# Patient Record
Sex: MALE | Race: WHITE | Employment: FULL TIME | URBAN - METROPOLITAN AREA
[De-identification: names, ages, dates, MRNs, and addresses within clinical notes are randomized per-mention and may not be internally consistent; named-entity substitution may affect disease eponyms.]

---

## 2019-09-30 ENCOUNTER — OFFICE VISIT (OUTPATIENT)
Dept: ORTHOPEDIC SURGERY | Age: 49
End: 2019-09-30
Payer: COMMERCIAL

## 2019-09-30 VITALS
HEART RATE: 87 BPM | BODY MASS INDEX: 33.11 KG/M2 | DIASTOLIC BLOOD PRESSURE: 84 MMHG | HEIGHT: 74 IN | SYSTOLIC BLOOD PRESSURE: 138 MMHG | WEIGHT: 258 LBS

## 2019-09-30 DIAGNOSIS — M79.604 RIGHT LEG PAIN: ICD-10-CM

## 2019-09-30 DIAGNOSIS — S82.451A CLOSED DISPLACED COMMINUTED FRACTURE OF SHAFT OF RIGHT FIBULA, INITIAL ENCOUNTER: ICD-10-CM

## 2019-09-30 DIAGNOSIS — S93.04XA ANKLE DISLOCATION, RIGHT, INITIAL ENCOUNTER: ICD-10-CM

## 2019-09-30 DIAGNOSIS — M25.571 ACUTE RIGHT ANKLE PAIN: Primary | ICD-10-CM

## 2019-09-30 DIAGNOSIS — S82.864A CLOSED NONDISPLACED MAISONNEUVE FRACTURE OF RIGHT LOWER EXTREMITY, INITIAL ENCOUNTER: ICD-10-CM

## 2019-09-30 PROCEDURE — 29515 APPLICATION SHORT LEG SPLINT: CPT | Performed by: FAMILY MEDICINE

## 2019-09-30 PROCEDURE — 99203 OFFICE O/P NEW LOW 30 MIN: CPT | Performed by: FAMILY MEDICINE

## 2019-09-30 RX ORDER — HYDROCODONE BITARTRATE AND ACETAMINOPHEN 5; 325 MG/1; MG/1
1 TABLET ORAL EVERY 6 HOURS PRN
Qty: 28 TABLET | Refills: 0 | Status: ON HOLD | OUTPATIENT
Start: 2019-09-30 | End: 2019-10-07 | Stop reason: HOSPADM

## 2019-09-30 RX ORDER — IBUPROFEN 800 MG/1
800 TABLET ORAL EVERY 8 HOURS PRN
Qty: 90 TABLET | Refills: 3 | Status: SHIPPED | OUTPATIENT
Start: 2019-09-30 | End: 2019-09-30 | Stop reason: CLARIF

## 2019-09-30 RX ORDER — IBUPROFEN 200 MG
200 TABLET ORAL EVERY 6 HOURS PRN
Status: ON HOLD | COMMUNITY
End: 2019-10-07

## 2019-09-30 RX ORDER — HYDROCODONE BITARTRATE AND ACETAMINOPHEN 5; 325 MG/1; MG/1
1 TABLET ORAL
COMMUNITY
Start: 2019-09-28 | End: 2019-10-01

## 2019-09-30 RX ORDER — IBUPROFEN 800 MG/1
800 TABLET ORAL EVERY 8 HOURS PRN
Qty: 90 TABLET | Refills: 3 | Status: SHIPPED | OUTPATIENT
Start: 2019-09-30 | End: 2019-10-30

## 2019-09-30 NOTE — PROGRESS NOTES
right ankle pain Yes    Right leg pain     Closed displaced comminuted fracture of shaft of right fibula, initial encounter     Closed nondisplaced Maisonneuve fracture of right lower extremity, initial encounter     Ankle dislocation, right, initial encounter        Orders Placed This Encounter   Procedures    XR ANKLE RIGHT (MIN 3 VIEWS)     Standing Status:   Future     Number of Occurrences:   1     Standing Expiration Date:   9/30/2020     Order Specific Question:   Reason for exam:     Answer:   pain    XR TIBIA FIBULA RIGHT (2 VIEWS)     Standing Status:   Future     Number of Occurrences:   1     Standing Expiration Date:   9/30/2020   Sherlye Wagner MD, Orthopedic Surgery, [facility]     Referral Priority:   Routine     Referral Type:   Eval and Treat     Referral Reason:   Specialty Services Required     Referred to Provider:   Ashley Aguilera MD     Requested Specialty:   Orthopedic Surgery     Number of Visits Requested:   1    ID CAST SUP SHORT LEG SPLINT ADULT FIBERGLASS    ID APPLY LOWER LEG SPLINT           Treatment Plan:  Treatment options were discussed Alma Johnson. We did review his plain films and exam findings. He does appear to have a Maisonneuve injury to his ankle complicated by ankle dislocation on 9/28/2019 and does have medial mortise widening as well as a mildly displaced fibular shaft fracture with a possible posterior malleolar component. He is swollen he was converted from his posterior splint to a well molded Burgess dressing with lateral support. He will work on ice and elevation. He was given a prescription for ibuprofen 800 mg 1 pill 3 times daily given a refill on his hydrocodone 5/325 1 p.o. every 6 hours as needed pain. He will ice and elevate. He is off of work and I would like for him to have foot and ankle consultation with Dr. Ashley Aguilera to consider stabilization given his ankle mortise widening.   He did have an appointment later this week with

## 2019-10-03 ENCOUNTER — OFFICE VISIT (OUTPATIENT)
Dept: ORTHOPEDIC SURGERY | Age: 49
End: 2019-10-03
Payer: COMMERCIAL

## 2019-10-03 VITALS — BODY MASS INDEX: 33.1 KG/M2 | WEIGHT: 257.94 LBS | HEIGHT: 74 IN

## 2019-10-03 DIAGNOSIS — S82.864A CLOSED NONDISPLACED MAISONNEUVE FRACTURE OF RIGHT LOWER EXTREMITY, INITIAL ENCOUNTER: Primary | ICD-10-CM

## 2019-10-03 PROCEDURE — 99213 OFFICE O/P EST LOW 20 MIN: CPT | Performed by: ORTHOPAEDIC SURGERY

## 2019-10-04 ENCOUNTER — TELEPHONE (OUTPATIENT)
Dept: ORTHOPEDIC SURGERY | Age: 49
End: 2019-10-04

## 2019-10-04 ENCOUNTER — ANESTHESIA EVENT (OUTPATIENT)
Dept: OPERATING ROOM | Age: 49
End: 2019-10-04
Payer: COMMERCIAL

## 2019-10-07 ENCOUNTER — HOSPITAL ENCOUNTER (OUTPATIENT)
Age: 49
Setting detail: OUTPATIENT SURGERY
Discharge: HOME | End: 2019-10-07
Attending: ORTHOPAEDIC SURGERY | Admitting: ORTHOPAEDIC SURGERY
Payer: COMMERCIAL

## 2019-10-07 ENCOUNTER — ANESTHESIA (OUTPATIENT)
Dept: OPERATING ROOM | Age: 49
End: 2019-10-07
Payer: COMMERCIAL

## 2019-10-07 ENCOUNTER — TELEPHONE (OUTPATIENT)
Dept: ORTHOPEDIC SURGERY | Age: 49
End: 2019-10-07

## 2019-10-07 VITALS
WEIGHT: 258 LBS | DIASTOLIC BLOOD PRESSURE: 73 MMHG | BODY MASS INDEX: 33.11 KG/M2 | TEMPERATURE: 97.2 F | HEIGHT: 74 IN | HEART RATE: 67 BPM | RESPIRATION RATE: 16 BRPM | OXYGEN SATURATION: 97 % | SYSTOLIC BLOOD PRESSURE: 125 MMHG

## 2019-10-07 VITALS
DIASTOLIC BLOOD PRESSURE: 57 MMHG | SYSTOLIC BLOOD PRESSURE: 116 MMHG | RESPIRATION RATE: 7 BRPM | OXYGEN SATURATION: 97 %

## 2019-10-07 DIAGNOSIS — S82.861G: Primary | ICD-10-CM

## 2019-10-07 PROCEDURE — 64447 NJX AA&/STRD FEMORAL NRV IMG: CPT | Performed by: ANESTHESIOLOGY

## 2019-10-07 PROCEDURE — 3700000000 HC ANESTHESIA ATTENDED CARE: Performed by: ORTHOPAEDIC SURGERY

## 2019-10-07 PROCEDURE — 3600000013 HC SURGERY LEVEL 3 ADDTL 15MIN: Performed by: ORTHOPAEDIC SURGERY

## 2019-10-07 PROCEDURE — 7100000000 HC PACU RECOVERY - FIRST 15 MIN: Performed by: ORTHOPAEDIC SURGERY

## 2019-10-07 PROCEDURE — 3700000001 HC ADD 15 MINUTES (ANESTHESIA): Performed by: ORTHOPAEDIC SURGERY

## 2019-10-07 PROCEDURE — 64445 NJX AA&/STRD SCIATIC NRV IMG: CPT | Performed by: ANESTHESIOLOGY

## 2019-10-07 PROCEDURE — 2500000003 HC RX 250 WO HCPCS: Performed by: ORTHOPAEDIC SURGERY

## 2019-10-07 PROCEDURE — 7100000011 HC PHASE II RECOVERY - ADDTL 15 MIN: Performed by: ORTHOPAEDIC SURGERY

## 2019-10-07 PROCEDURE — 6360000002 HC RX W HCPCS: Performed by: ANESTHESIOLOGY

## 2019-10-07 PROCEDURE — 3600000003 HC SURGERY LEVEL 3 BASE: Performed by: ORTHOPAEDIC SURGERY

## 2019-10-07 PROCEDURE — 2500000003 HC RX 250 WO HCPCS: Performed by: NURSE ANESTHETIST, CERTIFIED REGISTERED

## 2019-10-07 PROCEDURE — 2500000003 HC RX 250 WO HCPCS: Performed by: ANESTHESIOLOGY

## 2019-10-07 PROCEDURE — C1713 ANCHOR/SCREW BN/BN,TIS/BN: HCPCS | Performed by: ORTHOPAEDIC SURGERY

## 2019-10-07 PROCEDURE — 6370000000 HC RX 637 (ALT 250 FOR IP): Performed by: ORTHOPAEDIC SURGERY

## 2019-10-07 PROCEDURE — 7100000001 HC PACU RECOVERY - ADDTL 15 MIN: Performed by: ORTHOPAEDIC SURGERY

## 2019-10-07 PROCEDURE — 2580000003 HC RX 258: Performed by: ORTHOPAEDIC SURGERY

## 2019-10-07 PROCEDURE — 7100000010 HC PHASE II RECOVERY - FIRST 15 MIN: Performed by: ORTHOPAEDIC SURGERY

## 2019-10-07 PROCEDURE — 6360000002 HC RX W HCPCS: Performed by: NURSE ANESTHETIST, CERTIFIED REGISTERED

## 2019-10-07 PROCEDURE — 6370000000 HC RX 637 (ALT 250 FOR IP): Performed by: ANESTHESIOLOGY

## 2019-10-07 PROCEDURE — 2709999900 HC NON-CHARGEABLE SUPPLY: Performed by: ORTHOPAEDIC SURGERY

## 2019-10-07 PROCEDURE — 2580000003 HC RX 258: Performed by: ANESTHESIOLOGY

## 2019-10-07 DEVICE — IMPLANTABLE DEVICE: Type: IMPLANTABLE DEVICE | Site: ANKLE | Status: FUNCTIONAL

## 2019-10-07 DEVICE — SYSTEM IMPL S STL KNOTLESS FOR SYNDESMOSIS REP TIGHTROPE: Type: IMPLANTABLE DEVICE | Site: ANKLE | Status: FUNCTIONAL

## 2019-10-07 RX ORDER — HYDROCODONE BITARTRATE AND ACETAMINOPHEN 5; 325 MG/1; MG/1
1 TABLET ORAL EVERY 6 HOURS PRN
Status: DISCONTINUED | OUTPATIENT
Start: 2019-10-07 | End: 2019-10-07 | Stop reason: HOSPADM

## 2019-10-07 RX ORDER — BUPIVACAINE HYDROCHLORIDE 2.5 MG/ML
INJECTION, SOLUTION EPIDURAL; INFILTRATION; INTRACAUDAL PRN
Status: DISCONTINUED | OUTPATIENT
Start: 2019-10-07 | End: 2019-10-07 | Stop reason: SDUPTHER

## 2019-10-07 RX ORDER — SODIUM CHLORIDE, SODIUM LACTATE, POTASSIUM CHLORIDE, CALCIUM CHLORIDE 600; 310; 30; 20 MG/100ML; MG/100ML; MG/100ML; MG/100ML
INJECTION, SOLUTION INTRAVENOUS CONTINUOUS
Status: DISCONTINUED | OUTPATIENT
Start: 2019-10-07 | End: 2019-10-07 | Stop reason: HOSPADM

## 2019-10-07 RX ORDER — PROMETHAZINE HYDROCHLORIDE 25 MG/ML
6.25 INJECTION, SOLUTION INTRAMUSCULAR; INTRAVENOUS
Status: DISCONTINUED | OUTPATIENT
Start: 2019-10-07 | End: 2019-10-07 | Stop reason: HOSPADM

## 2019-10-07 RX ORDER — MEPERIDINE HYDROCHLORIDE 50 MG/ML
12.5 INJECTION INTRAMUSCULAR; INTRAVENOUS; SUBCUTANEOUS EVERY 5 MIN PRN
Status: DISCONTINUED | OUTPATIENT
Start: 2019-10-07 | End: 2019-10-07 | Stop reason: HOSPADM

## 2019-10-07 RX ORDER — PROPOFOL 10 MG/ML
INJECTION, EMULSION INTRAVENOUS PRN
Status: DISCONTINUED | OUTPATIENT
Start: 2019-10-07 | End: 2019-10-07 | Stop reason: SDUPTHER

## 2019-10-07 RX ORDER — FENTANYL CITRATE 50 UG/ML
INJECTION, SOLUTION INTRAMUSCULAR; INTRAVENOUS
Status: COMPLETED
Start: 2019-10-07 | End: 2019-10-07

## 2019-10-07 RX ORDER — DEXAMETHASONE SODIUM PHOSPHATE 4 MG/ML
INJECTION, SOLUTION INTRA-ARTICULAR; INTRALESIONAL; INTRAMUSCULAR; INTRAVENOUS; SOFT TISSUE PRN
Status: DISCONTINUED | OUTPATIENT
Start: 2019-10-07 | End: 2019-10-07 | Stop reason: SDUPTHER

## 2019-10-07 RX ORDER — HYDROCODONE BITARTRATE AND ACETAMINOPHEN 5; 325 MG/1; MG/1
1 TABLET ORAL EVERY 6 HOURS PRN
Qty: 28 TABLET | Refills: 0 | Status: SHIPPED | OUTPATIENT
Start: 2019-10-07 | End: 2019-10-14 | Stop reason: SDUPTHER

## 2019-10-07 RX ORDER — SODIUM CHLORIDE 0.9 % (FLUSH) 0.9 %
10 SYRINGE (ML) INJECTION PRN
Status: DISCONTINUED | OUTPATIENT
Start: 2019-10-07 | End: 2019-10-07 | Stop reason: HOSPADM

## 2019-10-07 RX ORDER — LIDOCAINE HYDROCHLORIDE 20 MG/ML
INJECTION, SOLUTION EPIDURAL; INFILTRATION; INTRACAUDAL; PERINEURAL PRN
Status: DISCONTINUED | OUTPATIENT
Start: 2019-10-07 | End: 2019-10-07 | Stop reason: SDUPTHER

## 2019-10-07 RX ORDER — MIDAZOLAM HYDROCHLORIDE 1 MG/ML
INJECTION INTRAMUSCULAR; INTRAVENOUS PRN
Status: DISCONTINUED | OUTPATIENT
Start: 2019-10-07 | End: 2019-10-07 | Stop reason: SDUPTHER

## 2019-10-07 RX ORDER — FENTANYL CITRATE 50 UG/ML
25 INJECTION, SOLUTION INTRAMUSCULAR; INTRAVENOUS EVERY 5 MIN PRN
Status: DISCONTINUED | OUTPATIENT
Start: 2019-10-07 | End: 2019-10-07 | Stop reason: HOSPADM

## 2019-10-07 RX ORDER — ONDANSETRON 2 MG/ML
4 INJECTION INTRAMUSCULAR; INTRAVENOUS
Status: DISCONTINUED | OUTPATIENT
Start: 2019-10-07 | End: 2019-10-07 | Stop reason: HOSPADM

## 2019-10-07 RX ORDER — SODIUM CHLORIDE 0.9 % (FLUSH) 0.9 %
10 SYRINGE (ML) INJECTION EVERY 12 HOURS SCHEDULED
Status: DISCONTINUED | OUTPATIENT
Start: 2019-10-07 | End: 2019-10-07 | Stop reason: HOSPADM

## 2019-10-07 RX ORDER — OXYCODONE HYDROCHLORIDE AND ACETAMINOPHEN 5; 325 MG/1; MG/1
2 TABLET ORAL PRN
Status: DISCONTINUED | OUTPATIENT
Start: 2019-10-07 | End: 2019-10-07 | Stop reason: HOSPADM

## 2019-10-07 RX ORDER — MIDAZOLAM HYDROCHLORIDE 1 MG/ML
INJECTION INTRAMUSCULAR; INTRAVENOUS
Status: COMPLETED
Start: 2019-10-07 | End: 2019-10-07

## 2019-10-07 RX ORDER — ONDANSETRON 2 MG/ML
INJECTION INTRAMUSCULAR; INTRAVENOUS PRN
Status: DISCONTINUED | OUTPATIENT
Start: 2019-10-07 | End: 2019-10-07 | Stop reason: SDUPTHER

## 2019-10-07 RX ORDER — MAGNESIUM HYDROXIDE 1200 MG/15ML
LIQUID ORAL CONTINUOUS PRN
Status: COMPLETED | OUTPATIENT
Start: 2019-10-07 | End: 2019-10-07

## 2019-10-07 RX ORDER — HYDRALAZINE HYDROCHLORIDE 20 MG/ML
5 INJECTION INTRAMUSCULAR; INTRAVENOUS EVERY 10 MIN PRN
Status: DISCONTINUED | OUTPATIENT
Start: 2019-10-07 | End: 2019-10-07 | Stop reason: HOSPADM

## 2019-10-07 RX ORDER — FENTANYL CITRATE 50 UG/ML
INJECTION, SOLUTION INTRAMUSCULAR; INTRAVENOUS PRN
Status: DISCONTINUED | OUTPATIENT
Start: 2019-10-07 | End: 2019-10-07 | Stop reason: SDUPTHER

## 2019-10-07 RX ORDER — OXYCODONE HYDROCHLORIDE AND ACETAMINOPHEN 5; 325 MG/1; MG/1
1 TABLET ORAL PRN
Status: DISCONTINUED | OUTPATIENT
Start: 2019-10-07 | End: 2019-10-07 | Stop reason: HOSPADM

## 2019-10-07 RX ADMIN — HYDROMORPHONE HYDROCHLORIDE 0.25 MG: 1 INJECTION, SOLUTION INTRAMUSCULAR; INTRAVENOUS; SUBCUTANEOUS at 11:36

## 2019-10-07 RX ADMIN — Medication 900 MG: at 10:29

## 2019-10-07 RX ADMIN — ONDANSETRON 4 MG: 2 INJECTION INTRAMUSCULAR; INTRAVENOUS at 10:27

## 2019-10-07 RX ADMIN — FENTANYL CITRATE 25 MCG: 50 INJECTION INTRAMUSCULAR; INTRAVENOUS at 10:24

## 2019-10-07 RX ADMIN — SODIUM CHLORIDE, POTASSIUM CHLORIDE, SODIUM LACTATE AND CALCIUM CHLORIDE: 600; 310; 30; 20 INJECTION, SOLUTION INTRAVENOUS at 08:49

## 2019-10-07 RX ADMIN — BUPIVACAINE HYDROCHLORIDE 20 ML: 2.5 INJECTION, SOLUTION EPIDURAL; INFILTRATION; INTRACAUDAL; PERINEURAL at 09:02

## 2019-10-07 RX ADMIN — HYDROCODONE BITARTRATE AND ACETAMINOPHEN 1 TABLET: 5; 325 TABLET ORAL at 11:39

## 2019-10-07 RX ADMIN — PROPOFOL 200 MG: 10 INJECTION, EMULSION INTRAVENOUS at 10:24

## 2019-10-07 RX ADMIN — BUPIVACAINE HYDROCHLORIDE 30 ML: 2.5 INJECTION, SOLUTION EPIDURAL; INFILTRATION; INTRACAUDAL; PERINEURAL at 09:05

## 2019-10-07 RX ADMIN — FENTANYL CITRATE 100 MCG: 50 INJECTION INTRAMUSCULAR; INTRAVENOUS at 09:00

## 2019-10-07 RX ADMIN — LIDOCAINE HYDROCHLORIDE 40 MG: 20 INJECTION, SOLUTION EPIDURAL; INFILTRATION; INTRACAUDAL; PERINEURAL at 10:24

## 2019-10-07 RX ADMIN — DEXAMETHASONE SODIUM PHOSPHATE 10 MG: 4 INJECTION, SOLUTION INTRAMUSCULAR; INTRAVENOUS at 10:27

## 2019-10-07 RX ADMIN — FAMOTIDINE 20 MG: 10 INJECTION, SOLUTION INTRAVENOUS at 08:51

## 2019-10-07 RX ADMIN — MIDAZOLAM HYDROCHLORIDE 2 MG: 2 INJECTION, SOLUTION INTRAMUSCULAR; INTRAVENOUS at 09:00

## 2019-10-07 ASSESSMENT — PULMONARY FUNCTION TESTS
PIF_VALUE: 5
PIF_VALUE: 1
PIF_VALUE: 5
PIF_VALUE: 6
PIF_VALUE: 5
PIF_VALUE: 0
PIF_VALUE: 1
PIF_VALUE: 1
PIF_VALUE: 19
PIF_VALUE: 22
PIF_VALUE: 5
PIF_VALUE: 5
PIF_VALUE: 4
PIF_VALUE: 0
PIF_VALUE: 18
PIF_VALUE: 1
PIF_VALUE: 2
PIF_VALUE: 1
PIF_VALUE: 6
PIF_VALUE: 20
PIF_VALUE: 1
PIF_VALUE: 22
PIF_VALUE: 4
PIF_VALUE: 19
PIF_VALUE: 22
PIF_VALUE: 2
PIF_VALUE: 5
PIF_VALUE: 4
PIF_VALUE: 1
PIF_VALUE: 6
PIF_VALUE: 1
PIF_VALUE: 20
PIF_VALUE: 19
PIF_VALUE: 9
PIF_VALUE: 19
PIF_VALUE: 6
PIF_VALUE: 6
PIF_VALUE: 2
PIF_VALUE: 15
PIF_VALUE: 19
PIF_VALUE: 21
PIF_VALUE: 19
PIF_VALUE: 5
PIF_VALUE: 4
PIF_VALUE: 0
PIF_VALUE: 22
PIF_VALUE: 19
PIF_VALUE: 1
PIF_VALUE: 2
PIF_VALUE: 0

## 2019-10-07 ASSESSMENT — PAIN DESCRIPTION - PAIN TYPE
TYPE: SURGICAL PAIN

## 2019-10-07 ASSESSMENT — PAIN SCALES - GENERAL
PAINLEVEL_OUTOF10: 4
PAINLEVEL_OUTOF10: 4
PAINLEVEL_OUTOF10: 0

## 2019-10-07 ASSESSMENT — PAIN DESCRIPTION - DESCRIPTORS
DESCRIPTORS: ACHING
DESCRIPTORS: PRESSURE
DESCRIPTORS: ACHING

## 2019-10-07 ASSESSMENT — PAIN DESCRIPTION - LOCATION
LOCATION: ANKLE

## 2019-10-07 ASSESSMENT — PAIN DESCRIPTION - ORIENTATION
ORIENTATION: RIGHT

## 2019-10-07 ASSESSMENT — LIFESTYLE VARIABLES: SMOKING_STATUS: 0

## 2019-10-07 ASSESSMENT — PAIN DESCRIPTION - PROGRESSION: CLINICAL_PROGRESSION: RAPIDLY IMPROVING

## 2019-10-07 ASSESSMENT — PAIN - FUNCTIONAL ASSESSMENT: PAIN_FUNCTIONAL_ASSESSMENT: 0-10

## 2019-10-14 ENCOUNTER — TELEPHONE (OUTPATIENT)
Dept: ORTHOPEDIC SURGERY | Age: 49
End: 2019-10-14

## 2019-10-14 DIAGNOSIS — S82.861G: ICD-10-CM

## 2019-10-14 RX ORDER — HYDROCODONE BITARTRATE AND ACETAMINOPHEN 5; 325 MG/1; MG/1
1 TABLET ORAL EVERY 8 HOURS PRN
Qty: 21 TABLET | Refills: 0 | Status: SHIPPED | OUTPATIENT
Start: 2019-10-14 | End: 2019-10-23 | Stop reason: SDUPTHER

## 2019-10-17 ENCOUNTER — TELEPHONE (OUTPATIENT)
Dept: ORTHOPEDIC SURGERY | Age: 49
End: 2019-10-17

## 2019-10-22 ENCOUNTER — OFFICE VISIT (OUTPATIENT)
Dept: ORTHOPEDIC SURGERY | Age: 49
End: 2019-10-22
Payer: COMMERCIAL

## 2019-10-22 VITALS — BODY MASS INDEX: 33.1 KG/M2 | WEIGHT: 257.94 LBS | HEIGHT: 74 IN

## 2019-10-22 DIAGNOSIS — S82.864A CLOSED NONDISPLACED MAISONNEUVE FRACTURE OF RIGHT LOWER EXTREMITY, INITIAL ENCOUNTER: Primary | ICD-10-CM

## 2019-10-22 PROCEDURE — L4361 PNEUMA/VAC WALK BOOT PRE OTS: HCPCS | Performed by: ORTHOPAEDIC SURGERY

## 2019-10-22 PROCEDURE — 99024 POSTOP FOLLOW-UP VISIT: CPT | Performed by: ORTHOPAEDIC SURGERY

## 2019-10-23 ENCOUNTER — TELEPHONE (OUTPATIENT)
Dept: ORTHOPEDIC SURGERY | Age: 49
End: 2019-10-23

## 2019-10-23 DIAGNOSIS — S82.861G: ICD-10-CM

## 2019-10-23 RX ORDER — HYDROCODONE BITARTRATE AND ACETAMINOPHEN 5; 325 MG/1; MG/1
1 TABLET ORAL EVERY 8 HOURS PRN
Qty: 21 TABLET | Refills: 0 | Status: SHIPPED | OUTPATIENT
Start: 2019-10-23 | End: 2019-10-30

## 2019-11-12 ENCOUNTER — OFFICE VISIT (OUTPATIENT)
Dept: ORTHOPEDIC SURGERY | Age: 49
End: 2019-11-12

## 2019-11-12 VITALS — BODY MASS INDEX: 33.1 KG/M2 | HEIGHT: 74 IN | WEIGHT: 257.94 LBS

## 2019-11-12 DIAGNOSIS — S82.861G: Primary | ICD-10-CM

## 2019-11-12 PROCEDURE — 99024 POSTOP FOLLOW-UP VISIT: CPT | Performed by: ORTHOPAEDIC SURGERY

## 2019-11-20 ENCOUNTER — HOSPITAL ENCOUNTER (OUTPATIENT)
Dept: PHYSICAL THERAPY | Age: 49
Setting detail: THERAPIES SERIES
Discharge: HOME | End: 2019-11-20
Payer: COMMERCIAL

## 2019-11-20 PROCEDURE — 97110 THERAPEUTIC EXERCISES: CPT | Performed by: PHYSICAL THERAPIST

## 2019-11-20 PROCEDURE — 97161 PT EVAL LOW COMPLEX 20 MIN: CPT | Performed by: PHYSICAL THERAPIST

## 2019-11-27 ENCOUNTER — HOSPITAL ENCOUNTER (OUTPATIENT)
Dept: PHYSICAL THERAPY | Age: 49
Setting detail: THERAPIES SERIES
Discharge: HOME | End: 2019-11-27
Payer: COMMERCIAL

## 2019-11-27 PROCEDURE — 97016 VASOPNEUMATIC DEVICE THERAPY: CPT | Performed by: PHYSICAL THERAPIST

## 2019-11-27 PROCEDURE — 97110 THERAPEUTIC EXERCISES: CPT | Performed by: PHYSICAL THERAPIST

## 2019-11-27 PROCEDURE — 97140 MANUAL THERAPY 1/> REGIONS: CPT | Performed by: PHYSICAL THERAPIST

## 2019-11-29 ENCOUNTER — HOSPITAL ENCOUNTER (OUTPATIENT)
Dept: PHYSICAL THERAPY | Age: 49
Setting detail: THERAPIES SERIES
Discharge: HOME | End: 2019-11-29
Payer: COMMERCIAL

## 2019-11-29 PROCEDURE — 97110 THERAPEUTIC EXERCISES: CPT | Performed by: PHYSICAL THERAPIST

## 2019-11-29 PROCEDURE — 97140 MANUAL THERAPY 1/> REGIONS: CPT | Performed by: PHYSICAL THERAPIST

## 2019-11-29 PROCEDURE — 97016 VASOPNEUMATIC DEVICE THERAPY: CPT | Performed by: PHYSICAL THERAPIST

## 2019-12-02 ENCOUNTER — HOSPITAL ENCOUNTER (OUTPATIENT)
Dept: PHYSICAL THERAPY | Age: 49
Setting detail: THERAPIES SERIES
Discharge: HOME | End: 2019-12-02
Payer: COMMERCIAL

## 2019-12-02 PROCEDURE — 97140 MANUAL THERAPY 1/> REGIONS: CPT | Performed by: PHYSICAL THERAPIST

## 2019-12-02 PROCEDURE — 97016 VASOPNEUMATIC DEVICE THERAPY: CPT | Performed by: PHYSICAL THERAPIST

## 2019-12-02 PROCEDURE — 97110 THERAPEUTIC EXERCISES: CPT | Performed by: PHYSICAL THERAPIST

## 2019-12-03 ENCOUNTER — OFFICE VISIT (OUTPATIENT)
Dept: ORTHOPEDIC SURGERY | Age: 49
End: 2019-12-03
Payer: COMMERCIAL

## 2019-12-03 VITALS — BODY MASS INDEX: 33.1 KG/M2 | WEIGHT: 257.94 LBS | HEIGHT: 74 IN

## 2019-12-03 DIAGNOSIS — S82.861G: Primary | ICD-10-CM

## 2019-12-03 PROCEDURE — 99024 POSTOP FOLLOW-UP VISIT: CPT | Performed by: ORTHOPAEDIC SURGERY

## 2019-12-03 PROCEDURE — L1902 AFO ANKLE GAUNTLET PRE OTS: HCPCS | Performed by: ORTHOPAEDIC SURGERY

## 2019-12-04 ENCOUNTER — HOSPITAL ENCOUNTER (OUTPATIENT)
Dept: PHYSICAL THERAPY | Age: 49
Setting detail: THERAPIES SERIES
Discharge: HOME | End: 2019-12-04
Payer: COMMERCIAL

## 2019-12-04 PROCEDURE — 97016 VASOPNEUMATIC DEVICE THERAPY: CPT | Performed by: PHYSICAL THERAPIST

## 2019-12-04 PROCEDURE — 97110 THERAPEUTIC EXERCISES: CPT | Performed by: PHYSICAL THERAPIST

## 2019-12-04 PROCEDURE — 97140 MANUAL THERAPY 1/> REGIONS: CPT | Performed by: PHYSICAL THERAPIST

## 2019-12-09 ENCOUNTER — HOSPITAL ENCOUNTER (OUTPATIENT)
Dept: PHYSICAL THERAPY | Age: 49
Setting detail: THERAPIES SERIES
Discharge: HOME | End: 2019-12-09
Payer: COMMERCIAL

## 2019-12-09 PROCEDURE — 97140 MANUAL THERAPY 1/> REGIONS: CPT | Performed by: PHYSICAL THERAPIST

## 2019-12-09 PROCEDURE — 97110 THERAPEUTIC EXERCISES: CPT | Performed by: PHYSICAL THERAPIST

## 2019-12-11 ENCOUNTER — HOSPITAL ENCOUNTER (OUTPATIENT)
Dept: PHYSICAL THERAPY | Age: 49
Setting detail: THERAPIES SERIES
Discharge: HOME | End: 2019-12-11
Payer: COMMERCIAL

## 2019-12-11 PROCEDURE — 97110 THERAPEUTIC EXERCISES: CPT | Performed by: PHYSICAL THERAPIST

## 2019-12-11 PROCEDURE — 97140 MANUAL THERAPY 1/> REGIONS: CPT | Performed by: PHYSICAL THERAPIST

## 2019-12-16 ENCOUNTER — HOSPITAL ENCOUNTER (OUTPATIENT)
Dept: PHYSICAL THERAPY | Age: 49
Setting detail: THERAPIES SERIES
Discharge: HOME | End: 2019-12-16
Payer: COMMERCIAL

## 2019-12-16 PROCEDURE — 97110 THERAPEUTIC EXERCISES: CPT | Performed by: PHYSICAL THERAPIST

## 2019-12-16 PROCEDURE — 97016 VASOPNEUMATIC DEVICE THERAPY: CPT | Performed by: PHYSICAL THERAPIST

## 2019-12-16 PROCEDURE — 97140 MANUAL THERAPY 1/> REGIONS: CPT | Performed by: PHYSICAL THERAPIST

## 2019-12-18 ENCOUNTER — HOSPITAL ENCOUNTER (OUTPATIENT)
Dept: PHYSICAL THERAPY | Age: 49
Setting detail: THERAPIES SERIES
Discharge: HOME | End: 2019-12-18
Payer: COMMERCIAL

## 2019-12-18 PROCEDURE — 97110 THERAPEUTIC EXERCISES: CPT | Performed by: PHYSICAL THERAPIST

## 2019-12-18 PROCEDURE — 97140 MANUAL THERAPY 1/> REGIONS: CPT | Performed by: PHYSICAL THERAPIST

## 2019-12-23 ENCOUNTER — HOSPITAL ENCOUNTER (OUTPATIENT)
Dept: PHYSICAL THERAPY | Age: 49
Setting detail: THERAPIES SERIES
Discharge: HOME | End: 2019-12-23
Payer: COMMERCIAL

## 2019-12-23 PROCEDURE — 97110 THERAPEUTIC EXERCISES: CPT | Performed by: PHYSICAL THERAPIST

## 2019-12-23 PROCEDURE — 97140 MANUAL THERAPY 1/> REGIONS: CPT | Performed by: PHYSICAL THERAPIST

## 2019-12-27 ENCOUNTER — HOSPITAL ENCOUNTER (OUTPATIENT)
Dept: PHYSICAL THERAPY | Age: 49
Setting detail: THERAPIES SERIES
Discharge: HOME | End: 2019-12-27
Payer: COMMERCIAL

## 2019-12-27 PROCEDURE — 97110 THERAPEUTIC EXERCISES: CPT | Performed by: PHYSICAL THERAPIST

## 2019-12-27 PROCEDURE — 97140 MANUAL THERAPY 1/> REGIONS: CPT | Performed by: PHYSICAL THERAPIST

## 2019-12-30 ENCOUNTER — HOSPITAL ENCOUNTER (OUTPATIENT)
Dept: PHYSICAL THERAPY | Age: 49
Setting detail: THERAPIES SERIES
Discharge: HOME | End: 2019-12-30
Payer: COMMERCIAL

## 2019-12-30 PROCEDURE — 97140 MANUAL THERAPY 1/> REGIONS: CPT | Performed by: PHYSICAL THERAPIST

## 2019-12-30 PROCEDURE — 97016 VASOPNEUMATIC DEVICE THERAPY: CPT | Performed by: PHYSICAL THERAPIST

## 2019-12-30 PROCEDURE — 97110 THERAPEUTIC EXERCISES: CPT | Performed by: PHYSICAL THERAPIST

## 2020-01-03 ENCOUNTER — HOSPITAL ENCOUNTER (OUTPATIENT)
Dept: PHYSICAL THERAPY | Age: 50
Setting detail: THERAPIES SERIES
Discharge: HOME | End: 2020-01-03
Payer: COMMERCIAL

## 2020-01-03 PROCEDURE — 97110 THERAPEUTIC EXERCISES: CPT | Performed by: PHYSICAL THERAPIST

## 2020-01-03 PROCEDURE — 97140 MANUAL THERAPY 1/> REGIONS: CPT | Performed by: PHYSICAL THERAPIST

## 2020-01-03 NOTE — FLOWSHEET NOTE
[facility], [address]  Phone: [phone]  Fax [phone]    Physical Therapy Treatment Note/ Progress Report:           Date:  1/3/2020    Patient Name:  Claire Garrison    :  1970  MRN: [id number]  Restrictions/Precautions:    Medical/Treatment Diagnosis Information:  Diagnosis: S82.861G (ICD-10-CM) - Closed displaced Maisonneuve fracture of right lower leg. DOS:  10/7/19  OPERATION PERFORMED:  Open reduction syndesmotic repair Maisonneuve  Treatment Diagnosis: right ankle pain  M25.571, difficulty amb N10.2  Insurance/Certification information:  PT Insurance Information: BCBS - 3000D-MET-100%-$0CP-20PT- NEEDS [id number]  Physician Information:  Referring Practitioner: Dr. Susana Mendosa  Has the plan of care been signed (Y/N):        [x]  Yes  []  No     Date of Patient follow up with Physician:  20      Is this a Progress Report:     []  Yes  [x]  No        If Yes:  Date Range for reporting period:  Beginnin19  Ending:    Progress report will be due (10 Rx or 30 days whichever is less):        Recertification will be due (POC Duration  / 90 days whichever is less):        Visit # Insurance Allowable Requires auth   13 5 approved + 2 approved. + 2 approved + 9    []no        []yes:     Latex Allergy:  [x]NO      []YES  Preferred Language for Healthcare:   [x]English       []other:    SUBJECTIVE: Knee is feeling stronger and has less c/o pain. Pt feels he is unable to push off his hallux during gait as it causes medial ankle pain. Pt did go back to two crutches to calm down ankle.       OBJECTIVE:    Observation:       Test measurements:    ROM RIGHT current   Knee ext 5    Knee Flex 140    Ankle PF 30 40   Ankle DF Lacking 6 10   Ankle In 10 30   Ankle Ev 4 8   Strength  RIGHT    Quad tone fair    SLR indep    Ankle DF n/t    Ankle PF n/t    Ankle Inv n/t    Ankle EV n/t        Circumference    29.0 cm. Pain Scale 5-6 1-7   LEFS  80 24/80 = 70%     RESTRICTIONS/PRECAUTIONS: psoriasis    Exercises/Interventions:     See ATC. Therapeutic Ex (59302) Sets/sec Reps Notes/CUES      hep   hep            hep   hep   Seated HR / TL  2 min hep   1/2 foam DF/ PF  2 min     Towel curl  2 min    Roller s  2 min    Standing gastroc s :20 3x    Weight shift  Side to side/  Front to back  X 20    Mini squat  X 20    Manual Intervention (37362)      Manual gs X 2 min     STM to posterior tib and gastroc X 5 min   Hawks .     great toe stretching X 2 min      Prone quad stretch 4 x 20\"                  NMR re-education (14560)   CUES NEEDED   Tandem stance on foam 2x1 min  Unable with right foot back                       Therapeutic Exercise and NMR EXR  [x] (68086) Provided verbal/tactile cueing for activities related to strengthening, flexibility, endurance, ROM for improvements in LE, proximal hip, and core control with self care, mobility, lifting, ambulation.  [] (24632) Provided verbal/tactile cueing for activities related to improving balance, coordination, kinesthetic sense, posture, motor skill, proprioception  to assist with LE, proximal hip, and core control in self care, mobility, lifting, ambulation and eccentric single leg control.      NMR and Therapeutic Activities:    [] (17811 or 49366) Provided verbal/tactile cueing for activities related to improving balance, coordination, kinesthetic sense, posture, motor skill, proprioception and motor activation to allow for proper function of core, proximal hip and LE with self care and ADLs  [] (79335) Gait Re-education- Provided training and instruction to the patient for proper LE, core and proximal hip recruitment and positioning and eccentric body weight control with ambulation re-education including up and down stairs     Home Exercise Program:    [x] (05788) Reviewed/Progressed HEP activities related to strengthening, flexibility, endurance, ROM of core, proximal hip and LE for functional self-care, mobility, lifting and ambulation/stair navigation   [] (89401)Reviewed/Progressed HEP activities related to improving balance, coordination, kinesthetic sense, posture, motor skill, proprioception of core, proximal hip and LE for self care, mobility, lifting, and ambulation/stair navigation      Manual Treatments:  PROM / STM / Oscillations-Mobs:  G-I, II, III, IV (PA's, Inf., Post.)  [x] (06249) Provided manual therapy to mobilize LE, proximal hip and/or LS spine soft tissue/joints for the purpose of modulating pain, promoting relaxation,  increasing ROM, reducing/eliminating soft tissue swelling/inflammation/restriction, improving soft tissue extensibility and allowing for proper ROM for normal function with self care, mobility, lifting and ambulation. Modalities:     [] GAME READY (VASO)- for significant edema, swelling, pain control.  (low pressure)    Charges:  Timed Code Treatment Minutes: 35   Total Treatment Minutes: 55     [] EVAL (LOW) 68625 (typically 20 minutes face-to-face)  [] EVAL (MOD) 63821 (typically 30 minutes face-to-face)  [] EVAL (HIGH) 51454 (typically 45 minutes face-to-face)  [] RE-EVAL     [x] FR(91498) x 1   [] IONTO  [] NMR (42295) x     [] VASO  [x] Manual (33286) x 1     [x] Other:  Ice   [] TA x      [] Mech Traction (36028)  [] ES(attended) (12862)      [] ES (un) (46275):     GOALS:   Patient stated goal: standing/ walking/ running/ jumping. Work. [] Progressing: [] Met: [] Not Met: [] Adjusted    Therapist goals for Patient:   Short Term Goals: To be achieved in: 2 weeks  1. Independent in HEP and progression per patient tolerance, in order to prevent re-injury. [x] Progressing: [] Met: [] Not Met: [] Adjusted  2. Patient will have a decrease in pain to facilitate improvement in movement, function, and ADLs as indicated by Functional Deficits.   [x] Progressing: [] Met: [] Not Met: [] Adjusted    Long Term Goals: To be achieved in: 10 weeks  1. Disability index score of 30% or less for the LEFS to assist with reaching prior level of function. [x] Progressing: [] Met: [] Not Met: [] Adjusted  2. Patient will demonstrate increased AROM to 10 DF,  40 PF,  20 Inv, 10 EV to allow for proper joint functioning as indicated by patients Functional Deficits. [] Progressing: [x] Met: [] Not Met: [] Adjusted  3. Patient will demonstrate an increase in Strength to good proximal hip strength and control, within 5lb HHD in LE to allow for proper functional mobility as indicated by patients Functional Deficits. [] Progressing: [x] Met: [] Not Met: [] Adjusted  4. Patient will return to ascending and descending stairs and ladders without increased symptoms or restriction. [x] Progressing: [] Met: [] Not Met: [] Adjusted  5. Able to kneel, crawl, squat and return to work. [] Progressing: [] Met: [x] Not Met: [] Adjusted    Progression Towards Functional goals:  [x] Patient is progressing as expected towards functional goals listed. [] Progression is slowed due to complexities listed. [] Progression has been slowed due to co-morbidities. [] Plan just implemented, too soon to assess goals progression  [] Other:     Overall Progression Towards Functional goals/ Treatment Progress Update:  [x] Patient is progressing as expected towards functional goals listed. [] Progression is slowed due to complexities/Impairments listed. [] Progression has been slowed due to co-morbidities.   [] Plan just implemented, too soon to assess goals progression <30days   [] Goals require adjustment due to lack of progress  [] Patient is not progressing as expected and requires additional follow up with physician  [] Other    Prognosis for POC: [x] Good [] Fair  [] Poor      Patient requires continued skilled intervention: [x] Yes  [] No    Treatment/Activity Tolerance:  [x] Patient able to complete treatment  [] Patient

## 2020-01-03 NOTE — FLOWSHEET NOTE
Ecc.                           Inv.              OPAL   Flex                  ABd   45# R 3x10               ADd                 TKE   60# 30x5\"               Ext              Steps Up                  Up and Over                  Down                  Lateral                  Rotation              Squats  mini                     wall                    BOSU              Lunges:  Lunge to Balance                      Balance to Lunge                      Walking              Knee Extension Bilat. Ecc.                                  Inv. Hamstring Curls Bilat. Ecc.                                  Inv.              Soleus Press Bilat. Ecc.                              Inv.                                      Ladders                   Square                  Jump/Hop  Low                         Med.                         High                                                                     Modality declined declined declined GR 15'   Initials                             DTM DTM DTM DTM   Time spent one on one (workers comp)       Time spent with PT assistant

## 2020-01-06 ENCOUNTER — HOSPITAL ENCOUNTER (OUTPATIENT)
Dept: PHYSICAL THERAPY | Age: 50
Setting detail: THERAPIES SERIES
Discharge: HOME | End: 2020-01-06
Payer: COMMERCIAL

## 2020-01-06 PROCEDURE — 97140 MANUAL THERAPY 1/> REGIONS: CPT | Performed by: PHYSICAL THERAPIST

## 2020-01-06 PROCEDURE — 97110 THERAPEUTIC EXERCISES: CPT | Performed by: PHYSICAL THERAPIST

## 2020-01-06 NOTE — FLOWSHEET NOTE
[facility], [address]  Phone: [phone]  Fax [phone]    Physical Therapy Treatment Note/ Progress Report:           Date:  2020    Patient Name:  Shabbir Chandler    :  1970  MRN: [id number]  Restrictions/Precautions:    Medical/Treatment Diagnosis Information:  Diagnosis: S82.861G (ICD-10-CM) - Closed displaced Maisonneuve fracture of right lower leg. DOS:  10/7/19  OPERATION PERFORMED:  Open reduction syndesmotic repair Maisonneuve  Treatment Diagnosis: right ankle pain  M25.571, difficulty amb G84.0  Insurance/Certification information:  PT Insurance Information: BCBS - 3000D-MET-100%-$0CP-20PT- NEEDS [place] [id number]  Physician Information:  Referring Practitioner: Dr. William Cid  Has the plan of care been signed (Y/N):        [x]  Yes  []  No     Date of Patient follow up with Physician:  20      Is this a Progress Report:     []  Yes  [x]  No        If Yes:  Date Range for reporting period:  Beginnin19  Ending:    Progress report will be due (10 Rx or 30 days whichever is less):        Recertification will be due (POC Duration  / 90 days whichever is less):        Visit # Insurance Allowable Requires auth   14 5 approved + 2 approved. + 2 approved + 9    []no        []yes:     Latex Allergy:  [x]NO      []YES  Preferred Language for Healthcare:   [x]English       []other:    SUBJECTIVE: Sharp pain in ankle is occurring less. Pt is able to perform balance exercises without brace with significantly less pain than with brace. Pt is unable to walk inclines. Pt amb with one crutch.       OBJECTIVE:    Observation:       Test measurements:    ROM RIGHT current   Knee ext 5    Knee Flex 140    Ankle PF 30 40   Ankle DF Lacking 6 10   Ankle In 10 30   Ankle Ev 4 8   Strength  RIGHT    Quad tone fair    SLR indep    Ankle DF n/t    Ankle PF n/t    Ankle Inv n/t    Ankle EV n/t        Circumference    29.0 cm. Pain Scale 5-6 1-7   LEFS  80 24/80 = 70%     RESTRICTIONS/PRECAUTIONS: psoriasis    Exercises/Interventions:     See ATC. Therapeutic Ex (56588) Sets/sec Reps Notes/CUES      hep   Seated towel s:30 4x hep            hep   hep   Seated HR / TL  2 min hep   1/2 foam DF/ PF  2 min     Towel curl  2 min    Roller s  2 min    Standing gastroc s :20 3x    Weight shift  Side to side/  Front to back  X 20    Mini squat  X 20    Manual Intervention (25099)      Manual gs X 2 min     STM to posterior tib and gastroc X 5 min   Hawks .     great toe stretching X 2 min      Prone quad stretch 4 x 20\"                  NMR re-education (42658)   CUES NEEDED   Tandem stance on foam 2x1 min  Unable with right foot back                       Therapeutic Exercise and NMR EXR  [x] (94865) Provided verbal/tactile cueing for activities related to strengthening, flexibility, endurance, ROM for improvements in LE, proximal hip, and core control with self care, mobility, lifting, ambulation.  [] (86277) Provided verbal/tactile cueing for activities related to improving balance, coordination, kinesthetic sense, posture, motor skill, proprioception  to assist with LE, proximal hip, and core control in self care, mobility, lifting, ambulation and eccentric single leg control.      NMR and Therapeutic Activities:    [] (19129 or 96930) Provided verbal/tactile cueing for activities related to improving balance, coordination, kinesthetic sense, posture, motor skill, proprioception and motor activation to allow for proper function of core, proximal hip and LE with self care and ADLs  [] (70115) Gait Re-education- Provided training and instruction to the patient for proper LE, core and proximal hip recruitment and positioning and eccentric body weight control with ambulation re-education including up and down stairs     Home Exercise Program:    [x] (15475) Reviewed/Progressed HEP activities related to strengthening, flexibility, endurance, ROM of core, proximal hip and LE for functional self-care, mobility, lifting and ambulation/stair navigation   [] (84716)Reviewed/Progressed HEP activities related to improving balance, coordination, kinesthetic sense, posture, motor skill, proprioception of core, proximal hip and LE for self care, mobility, lifting, and ambulation/stair navigation      Manual Treatments:  PROM / STM / Oscillations-Mobs:  G-I, II, III, IV (PA's, Inf., Post.)  [x] (89290) Provided manual therapy to mobilize LE, proximal hip and/or LS spine soft tissue/joints for the purpose of modulating pain, promoting relaxation,  increasing ROM, reducing/eliminating soft tissue swelling/inflammation/restriction, improving soft tissue extensibility and allowing for proper ROM for normal function with self care, mobility, lifting and ambulation. Modalities:     [] GAME READY (VASO)- for significant edema, swelling, pain control.  (low pressure)    Charges:  Timed Code Treatment Minutes: 35   Total Treatment Minutes: 55     [] EVAL (LOW) 67380 (typically 20 minutes face-to-face)  [] EVAL (MOD) 41333 (typically 30 minutes face-to-face)  [] EVAL (HIGH) 04491 (typically 45 minutes face-to-face)  [] RE-EVAL     [x] OA(77063) x 1   [] IONTO  [] NMR (19919) x     [] VASO  [x] Manual (49637) x 1     [x] Other:  Ice   [] TA x      [] Mech Traction (95170)  [] ES(attended) (18895)      [] ES (un) (45998):     GOALS:   Patient stated goal: standing/ walking/ running/ jumping. Work. [] Progressing: [] Met: [] Not Met: [] Adjusted    Therapist goals for Patient:   Short Term Goals: To be achieved in: 2 weeks  1. Independent in HEP and progression per patient tolerance, in order to prevent re-injury. [x] Progressing: [] Met: [] Not Met: [] Adjusted  2. Patient will have a decrease in pain to facilitate improvement in movement, function, and ADLs as indicated by Functional Deficits.   [x] Progressing: [] Met: []

## 2020-01-10 ENCOUNTER — HOSPITAL ENCOUNTER (OUTPATIENT)
Dept: PHYSICAL THERAPY | Age: 50
Setting detail: THERAPIES SERIES
Discharge: HOME | End: 2020-01-10
Payer: COMMERCIAL

## 2020-01-10 PROCEDURE — 97112 NEUROMUSCULAR REEDUCATION: CPT | Performed by: PHYSICAL THERAPIST

## 2020-01-10 PROCEDURE — 97140 MANUAL THERAPY 1/> REGIONS: CPT | Performed by: PHYSICAL THERAPIST

## 2020-01-10 NOTE — FLOWSHEET NOTE
prior level of function. [x] Progressing: [] Met: [] Not Met: [] Adjusted  2. Patient will demonstrate increased AROM to 10 DF,  40 PF,  20 Inv, 10 EV to allow for proper joint functioning as indicated by patients Functional Deficits. [] Progressing: [x] Met: [] Not Met: [] Adjusted  3. Patient will demonstrate an increase in Strength to good proximal hip strength and control, within 5lb HHD in LE to allow for proper functional mobility as indicated by patients Functional Deficits. [] Progressing: [x] Met: [] Not Met: [] Adjusted  4. Patient will return to ascending and descending stairs and ladders without increased symptoms or restriction. [x] Progressing: [] Met: [] Not Met: [] Adjusted  5. Able to kneel, crawl, squat and return to work. [] Progressing: [] Met: [x] Not Met: [] Adjusted    Progression Towards Functional goals:  [x] Patient is progressing as expected towards functional goals listed. [] Progression is slowed due to complexities listed. [] Progression has been slowed due to co-morbidities. [] Plan just implemented, too soon to assess goals progression  [] Other:     Overall Progression Towards Functional goals/ Treatment Progress Update:  [x] Patient is progressing as expected towards functional goals listed. [] Progression is slowed due to complexities/Impairments listed. [] Progression has been slowed due to co-morbidities.   [] Plan just implemented, too soon to assess goals progression <30days   [] Goals require adjustment due to lack of progress  [] Patient is not progressing as expected and requires additional follow up with physician  [] Other    Prognosis for POC: [x] Good [] Fair  [] Poor      Patient requires continued skilled intervention: [x] Yes  [] No    Treatment/Activity Tolerance:  [x] Patient able to complete treatment  [] Patient limited by fatigue  [] Patient limited by pain    [] Patient limited by other medical complications  [] Other:     ASSESSMENT:  Pt responded well to STm of post tib. PLAN: See eval  [x] Continue per plan of care [] Alter current plan (see comments above)  [] Plan of care initiated [] Hold pending MD visit [] Discharge      Electronically signed by:  Aaron Boyce PT    Note: If patient does not return for scheduled/ recommended follow up visits, this note will serve as a discharge from care along with most recent update on progress.

## 2020-01-10 NOTE — FLOWSHEET NOTE
[facility], [address] [place]  Phone: [phone]  Fax [phone]      ATHLETIC TRAINING AREA ACTIVITY SHEET  Date:  1/10/2020    Patient Name:  Mirella Lock    :  1970  MRN: [id number]  Restrictions/Precautions:    Medical/Treatment Diagnosis Information:  ·  R ORIF     Physician Information:   Linz    Weeks Post-op  8 wks  12 wks 16 wks 20 wks   24 wks                            Activity Log                                                  DOS/DOI:                                                    Date: 12/18/19 12/23/19 12/27/19 1/3/20 1/6/20 1/10/2020   ATC communication: 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- discussed modifying to kneeling/lunge  Tentative RTW 2020 - slippery surfaces Sore today  Going to choir concert tonight- short on time Hurt ankle Fri pivoting- still sore today alterG broken    No brace for reformer, back on for weights No reformer/weights today  alterG & then begin wts   Bike         Elliptical         Treadmill/AlterG    Sz XL  Ht 12   60% BW  Walk  10'  50% BW   50% BW  Walk  12'  50% BW  HR/TR x30 Resume NV 50% BW   HR/TR x20    Walk  8' 50% BW   HR/TR x20    Walk  10' 50% BW   HR/TR x10  6\" lat step, R diag x5 ea    Walk 12'   Airdyne                  Gastroc stretch         Soleus stretch         Hamstring stretch         ITB stretch         Hip Flexor stretch         Quad stretch         Adductor stretch                  Reformer // Heels/Toes  3R1B x25 3R x20 toes add  2R1B x20 add                    Wide Heels/Toes  3R1B x25   2R1B x20                    Walking  3R1B x25 3R x20  2R1B x20                    HR  2R x25 add 2R x20 add  2R x20 add                    Soleus Press   1R x15  1R x20             Weight Shifting sp                                   fp                                   tp         Lateral walking (with/w/o TB) Balance: PEP/Paulina board                        SLS               Star excursion load/explode               Extremity reach UE/LE                  Leg Press Franklyn. 80# 3x10 lxx add   80# 3x10                     Ecc.      60# 2x10                     Inv. Calf Press Franklyn. Ecc.                             Inv.                  OPAL   Flex                    ABd   45# R 3x10   45# R/L 3x10              ADd                   TKE   60# 30x5\"   60# 30x5\"              Ext      60# R/L 2x10            Steps Up                    Up and Over                    Down                    Lateral                    Rotation                  Squats  mini                       wall                      BOSU                  Lunges:  Lunge to Balance                        Balance to Lunge                        Walking                  Knee Extension Bilat. NV                              Ecc.                                    Inv. Hamstring Curls Bilat. NV                              Ecc.                                    Inv.                  Soleus Press Bilat. NV? Ecc.                                Inv.                                            Ladders                     Square                    Jump/Hop  Low                           Med.                           High                                                                           Modality declined declined declined GR 15' declined declined   Initials                             DTM DTM DTM DTM DTM DB   Time spent one on one (workers comp)         Time spent with PT assistant

## 2020-01-13 ENCOUNTER — HOSPITAL ENCOUNTER (OUTPATIENT)
Dept: PHYSICAL THERAPY | Age: 50
Setting detail: THERAPIES SERIES
Discharge: HOME | End: 2020-01-13
Payer: COMMERCIAL

## 2020-01-14 ENCOUNTER — HOSPITAL ENCOUNTER (OUTPATIENT)
Dept: PHYSICAL THERAPY | Age: 50
Setting detail: THERAPIES SERIES
Discharge: HOME | End: 2020-01-14
Payer: COMMERCIAL

## 2020-01-14 PROCEDURE — 97112 NEUROMUSCULAR REEDUCATION: CPT | Performed by: PHYSICAL THERAPIST

## 2020-01-14 PROCEDURE — 97140 MANUAL THERAPY 1/> REGIONS: CPT | Performed by: PHYSICAL THERAPIST

## 2020-01-14 NOTE — FLOWSHEET NOTE
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  Tentative RTW 2020 - slippery surfaces No reformer/weights today  alterG & then begin wts    Bike       Elliptical       Treadmill/AlterG    Sz XL  Ht 12   50% BW   HR/TR x20    Walk  8' 50% BW   HR/TR x20    Walk  10' 50% BW   HR/TR x10  6\" lat step, R diag x5 ea    Walk 12' 50% BW   HR/TR x15  6\" lat step, R diag (fwd/bwd) x8 ea    Walk 12'   Airdyne              Gastroc stretch       Soleus stretch       Hamstring stretch       ITB stretch       Hip Flexor stretch       Quad stretch       Adductor stretch              Reformer // Heels/Toes  2R1B x20 add                     Wide Heels/Toes  2R1B x20                     Walking  2R1B x20                     HR  2R x20 add                     Soleus Press  1R x20            Weight Shifting sp                                 fp                                 tp       Lateral walking (with/w/o TB)              Balance: PEP/Paulina board                      SLS             Star excursion load/explode             Extremity reach UE/LE              Leg Press Franklyn. 80# 3x10 80# 3x10                     Ecc.   60# 2x10 60# 3x10                     Inv. Calf Press Franklyn.                           Ecc. Inv.              [place] Ket   45# R/L 3x10 45# R/L 3x10              ADd                 TKE   60# 30x5\" 75# 30x5\" to HR              Ext   60# R/L 2x10 60# R/L 3x10          Steps Up                  Up and Over                  Down                  Lateral                  Rotation              Squats  mini                     wall                    BOSU              Lunges:  Lunge to Balance                      Balance to Lunge                      Walking              Knee Extension Bilat. NV 20# x30                              Ecc.                                  Inv. Hamstring Curls Bilat. NV 30# x30                              Ecc.                                  Inv.              Soleus Press Bilat. NV? 30# x20                          Ecc.                              Inv.                                      Ladders                   Square                  Jump/Hop  Low                         Med.                         High                                                                     Modality GR 15' declined declined declined   Initials                             DTM DTM DB DTM   Time spent one on one (workers comp)       Time spent with PT assistant

## 2020-01-14 NOTE — FLOWSHEET NOTE
Circumference    29.0 cm. Pain Scale 5-6 1-7   LEFS  80 24/80 = 70%     RESTRICTIONS/PRECAUTIONS: psoriasis    Exercises/Interventions:     See ATC. Therapeutic Ex (50313) Sets/sec Reps Notes/CUES      hep   Seated towel s:30 4x hep            hep   hep   Seated Hx R / TL  2 min hep         Roller s  2 min    Standing gastroc s :20 3x                Manual Intervention (68523)      Manual gs X 2 min     STM to posterior tib and gastroc X 5 min   Hawks .     great toe stretching X 2 min      Prone quad stretch 2 x 20\"                  NMR re-education (28813)   CUES NEEDED   Tandem stance on foam 2x1 min  Unable with right foot back   Weight shifts X 10     Mini squats on foam X 10      Cone walk        Therapeutic Exercise and NMR EXR  [x] (99155) Provided verbal/tactile cueing for activities related to strengthening, flexibility, endurance, ROM for improvements in LE, proximal hip, and core control with self care, mobility, lifting, ambulation.  [] (62339) Provided verbal/tactile cueing for activities related to improving balance, coordination, kinesthetic sense, posture, motor skill, proprioception  to assist with LE, proximal hip, and core control in self care, mobility, lifting, ambulation and eccentric single leg control.      NMR and Therapeutic Activities:    [] (25343 or 67887) Provided verbal/tactile cueing for activities related to improving balance, coordination, kinesthetic sense, posture, motor skill, proprioception and motor activation to allow for proper function of core, proximal hip and LE with self care and ADLs  [] (25797) Gait Re-education- Provided training and instruction to the patient for proper LE, core and proximal hip recruitment and positioning and eccentric body weight control with ambulation re-education including up and down stairs     Home Exercise Program:    [x] (88310) Reviewed/Progressed HEP activities related to strengthening, flexibility, endurance, ROM of be achieved in: 10 weeks  1. Disability index score of 30% or less for the LEFS to assist with reaching prior level of function. [x] Progressing: [] Met: [] Not Met: [] Adjusted  2. Patient will demonstrate increased AROM to 10 DF,  40 PF,  20 Inv, 10 EV to allow for proper joint functioning as indicated by patients Functional Deficits. [] Progressing: [x] Met: [] Not Met: [] Adjusted  3. Patient will demonstrate an increase in Strength to good proximal hip strength and control, within 5lb HHD in LE to allow for proper functional mobility as indicated by patients Functional Deficits. [] Progressing: [x] Met: [] Not Met: [] Adjusted  4. Patient will return to ascending and descending stairs and ladders without increased symptoms or restriction. [x] Progressing: [] Met: [] Not Met: [] Adjusted  5. Able to kneel, crawl, squat and return to work. [] Progressing: [] Met: [x] Not Met: [] Adjusted    Progression Towards Functional goals:  [x] Patient is progressing as expected towards functional goals listed. [] Progression is slowed due to complexities listed. [] Progression has been slowed due to co-morbidities. [] Plan just implemented, too soon to assess goals progression  [] Other:     Overall Progression Towards Functional goals/ Treatment Progress Update:  [x] Patient is progressing as expected towards functional goals listed. [] Progression is slowed due to complexities/Impairments listed. [] Progression has been slowed due to co-morbidities.   [] Plan just implemented, too soon to assess goals progression <30days   [] Goals require adjustment due to lack of progress  [] Patient is not progressing as expected and requires additional follow up with physician  [] Other    Prognosis for POC: [x] Good [] Fair  [] Poor      Patient requires continued skilled intervention: [x] Yes  [] No    Treatment/Activity Tolerance:  [x] Patient able to complete treatment  [] Patient limited by fatigue  [] Patient

## 2020-01-17 ENCOUNTER — HOSPITAL ENCOUNTER (OUTPATIENT)
Dept: PHYSICAL THERAPY | Age: 50
Setting detail: THERAPIES SERIES
Discharge: HOME | End: 2020-01-17
Payer: COMMERCIAL

## 2020-01-17 PROCEDURE — 97140 MANUAL THERAPY 1/> REGIONS: CPT | Performed by: PHYSICAL THERAPIST

## 2020-01-17 PROCEDURE — 97112 NEUROMUSCULAR REEDUCATION: CPT | Performed by: PHYSICAL THERAPIST

## 2020-01-17 NOTE — FLOWSHEET NOTE
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  Tentative RTW 2020 - slippery surfaces No reformer/weights today  alterG & then begin wts  Ankle sore today- stepped weird  Calf still a little sore from last Rx   Bike        Elliptical        Treadmill/AlterG    Sz XL  Ht 12   50% BW   HR/TR x20    Walk  8' 50% BW   HR/TR x20    Walk  10' 50% BW   HR/TR x10  6\" lat step, R diag x5 ea    Walk 12' 50% BW   HR/TR x15  6\" lat step, R diag (fwd/bwd) x8 ea    Walk 12' 50% BW   HR/TR x20  6\" lat step, R diag (fwd/bwd) x10 ea    Walk 15'   Airdyne                Gastroc stretch        Soleus stretch        Hamstring stretch        ITB stretch        Hip Flexor stretch        Quad stretch        Adductor stretch                Reformer // Heels/Toes  2R1B x20 add                      Wide Heels/Toes  2R1B x20                      Walking  2R1B x20                      HR  2R x20 add                      Soleus Press  1R x20              Weight Shifting sp                                  fp                                  tp        Lateral walking (with/w/o TB)                Balance: PEP/Paulina board                       SLS              Star excursion load/explode              Extremity reach UE/LE Leg Press Franklyn. 80# 3x10 80# 3x10 resume NV                     Ecc.   60# 2x10 60# 3x10 60# 3x10                     Inv. Calf Press Franklyn. 40# 3x10                      Ecc.                            Inv.                OPAL   Flex     45# R/L 3x10              ABd   45# R/L 3x10 45# R/L 3x10 45# R/L 3x12              ADd     45# R/L 3x10             TKE   60# 30x5\" 75# 30x5\" to HR 75# 30x5\" to HR              Ext   60# R/L 2x10 60# R/L 3x10 60# R/L 3x12           Steps Up                   Up and Over                   Down                   Lateral                   Rotation                Squats  mini                      wall                     BOSU                Lunges:  Lunge to Balance                       Balance to Lunge                       Walking                Knee Extension Bilat. NV 20# x30 resume NV                              Ecc.                                   Inv. Hamstring Curls Bilat. NV 30# x30 30# 3x10                              Ecc.                                   Inv.                Soleus Press Bilat. NV? 30# x20 30# 3x10                          Ecc.                               Inv.                                         Ladders                    Square                   Jump/Hop  Low                          Med.                          High                                                                        Modality GR 15' declined declined declined declined   Initials                             DTM DTM DB DTM DTM   Time spent one on one (workers comp)        Time spent with PT assistant

## 2020-01-17 NOTE — FLOWSHEET NOTE
Therapeutic Ex (70755) Sets/sec Reps Notes/CUES      hep   Seated towel s:30 4x hep            hep   hep   Seated Hx R / TL  2 min hep         Roller s  2 min    Standing gastroc s :20 3x                Manual Intervention (93342)      Manual gs X 2 min     STM to posterior tib and gastroc X 5 min   Hawks .     great toe stretching X 2 min                     NMR re-education (91484)   CUES NEEDED   Tandem  2x1 min  Unable with right foot back   Weight shifts X 10     Mini squats on foam X 10      Cone walk        Therapeutic Exercise and NMR EXR  [x] (94352) Provided verbal/tactile cueing for activities related to strengthening, flexibility, endurance, ROM for improvements in LE, proximal hip, and core control with self care, mobility, lifting, ambulation.  [] (63481) Provided verbal/tactile cueing for activities related to improving balance, coordination, kinesthetic sense, posture, motor skill, proprioception  to assist with LE, proximal hip, and core control in self care, mobility, lifting, ambulation and eccentric single leg control.      NMR and Therapeutic Activities:    [] (76968 or 22766) Provided verbal/tactile cueing for activities related to improving balance, coordination, kinesthetic sense, posture, motor skill, proprioception and motor activation to allow for proper function of core, proximal hip and LE with self care and ADLs  [] (15102) Gait Re-education- Provided training and instruction to the patient for proper LE, core and proximal hip recruitment and positioning and eccentric body weight control with ambulation re-education including up and down stairs     Home Exercise Program:    [x] (59994) Reviewed/Progressed HEP activities related to strengthening, flexibility, endurance, ROM of core, proximal hip and LE for functional self-care, mobility, lifting and ambulation/stair navigation   [] (51083)Reviewed/Progressed HEP activities related to improving balance, coordination, kinesthetic demonstrate increased AROM to 10 DF,  40 PF,  20 Inv, 10 EV to allow for proper joint functioning as indicated by patients Functional Deficits. [] Progressing: [x] Met: [] Not Met: [] Adjusted  3. Patient will demonstrate an increase in Strength to good proximal hip strength and control, within 5lb HHD in LE to allow for proper functional mobility as indicated by patients Functional Deficits. [] Progressing: [x] Met: [] Not Met: [] Adjusted  4. Patient will return to ascending and descending stairs and ladders without increased symptoms or restriction. [x] Progressing: [] Met: [] Not Met: [] Adjusted  5. Able to kneel, crawl, squat and return to work. [] Progressing: [] Met: [x] Not Met: [] Adjusted    Progression Towards Functional goals:  [x] Patient is progressing as expected towards functional goals listed. [] Progression is slowed due to complexities listed. [] Progression has been slowed due to co-morbidities. [] Plan just implemented, too soon to assess goals progression  [] Other:     Overall Progression Towards Functional goals/ Treatment Progress Update:  [x] Patient is progressing as expected towards functional goals listed. [] Progression is slowed due to complexities/Impairments listed. [] Progression has been slowed due to co-morbidities. [] Plan just implemented, too soon to assess goals progression <30days   [] Goals require adjustment due to lack of progress  [] Patient is not progressing as expected and requires additional follow up with physician  [] Other    Prognosis for POC: [x] Good [] Fair  [] Poor      Patient requires continued skilled intervention: [x] Yes  [] No    Treatment/Activity Tolerance:  [x] Patient able to complete treatment  [] Patient limited by fatigue  [] Patient limited by pain    [] Patient limited by other medical complications  [] Other:     ASSESSMENT:  Pt responded well to STm of post tib.      PLAN: See eval  [x] Continue per plan of care [] Tesfaye Roberts current plan (see comments above)  [] Plan of care initiated [] Hold pending MD visit [] Discharge      Electronically signed by:  Sourav Chu PT    Note: If patient does not return for scheduled/ recommended follow up visits, this note will serve as a discharge from care along with most recent update on progress.

## 2020-01-20 ENCOUNTER — APPOINTMENT (OUTPATIENT)
Dept: PHYSICAL THERAPY | Age: 50
End: 2020-01-20
Payer: COMMERCIAL

## 2020-01-22 ENCOUNTER — HOSPITAL ENCOUNTER (OUTPATIENT)
Dept: PHYSICAL THERAPY | Age: 50
Setting detail: THERAPIES SERIES
Discharge: HOME | End: 2020-01-22
Payer: COMMERCIAL

## 2020-01-22 PROCEDURE — 97112 NEUROMUSCULAR REEDUCATION: CPT | Performed by: PHYSICAL THERAPIST

## 2020-01-22 PROCEDURE — 97140 MANUAL THERAPY 1/> REGIONS: CPT | Performed by: PHYSICAL THERAPIST

## 2020-01-22 NOTE — FLOWSHEET NOTE
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  Tentative RTW 2020 - slippery surfaces alterG & then begin wts  Ankle sore today- stepped weird  Calf still a little sore from last Rx HEP given- joining a gym   Bike       Elliptical       Treadmill/AlterG    Sz XL  Ht 12   50% BW   HR/TR x10  6\" lat step, R diag x5 ea    Walk 12' 50% BW   HR/TR x15  6\" lat step, R diag (fwd/bwd) x8 ea    Walk 12' 50% BW   HR/TR x20  6\" lat step, R diag (fwd/bwd) x10 ea    Walk 15'    Airdyne              Gastroc stretch       Soleus stretch       Hamstring stretch       ITB stretch       Hip Flexor stretch       Quad stretch       Adductor stretch              Reformer // Heels/Toes                       Wide Heels/Toes                       Walking                       HR                       Soleus Press              Weight Shifting sp                                 fp                                 tp       Lateral walking (with/w/o TB)              Balance: PEP/Paulina board                      SLS             Star excursion load/explode             Extremity reach UE/LE              Leg Press Franklyn.  80# 3x10 80# 3x10 resume NV 80# 3x15                     Ecc. 60# 2x10 60# 3x10 60# 3x10 60# 3x12 Inv.              Calf Press Franklyn. 40# 3x10 60# 3x10                      Ecc.                           Inv.              OPAL   Flex   45# R/L 3x10 45# R/L 3x10              ABd 45# R/L 3x10 45# R/L 3x10 45# R/L 3x12 45# R/L 3x15              ADd   45# R/L 3x10 45# R/L 3x12             TKE 60# 30x5\" 75# 30x5\" to HR 75# 30x5\" to HR 75# 30x5\" to HR              Ext 60# R/L 2x10 60# R/L 3x10 60# R/L 3x12 60# R/L 3x12          Steps Up                  Up and Over                  Down                  Lateral                  Rotation              Squats  mini                     wall                    BOSU              Lunges:  Lunge to Balance                      Balance to Lunge                      Walking              Knee Extension Bilat. NV 20# x30 resume NV ISOs DF 10x5\"                              Ecc.                                  Inv. Hamstring Curls Bilat. NV 30# x30 30# 3x10 35# 3x10 S                              Ecc.                                  Inv.              Soleus Press Bilat. NV? 30# x20 30# 3x10 40# 3x10 S                          Ecc.                              Inv.                                      Ladders                   Square                  Jump/Hop  Low                         Med.                         High                                                                     Modality declined declined declined declined   Initials                             DB DTM DTM DTM   Time spent one on one (workers comp)       Time spent with PT assistant

## 2020-01-22 NOTE — FLOWSHEET NOTE
[facility], [address]  Phone: [phone]  Fax [phone]    Physical Therapy Treatment Note/ Progress Report:           Date:  2020    Patient Name:  Jimena Gandhi    :  1970  MRN: [id number]  Restrictions/Precautions:    Medical/Treatment Diagnosis Information:  Diagnosis: S82.861G (ICD-10-CM) - Closed displaced [place]ison[place]uve fracture of right lower leg. DOS:  10/7/19  OPERATION PERFORMED:  Open reduction syndesmotic repair [place]uve  Treatment Diagnosis: right ankle pain  M25.571, difficulty amb L56.6  Insurance/Certification information:  PT Insurance Information: [employer] - [id number]- [id number] Kyle Ornelas  Physician Information:  Referring Practitioner: Dr. John Glez  Has the plan of care been signed (Y/N):        [x]  Yes  []  No     Date of Patient follow up with Physician:  20      Is this a Progress Report:     []  Yes  [x]  No        If Yes:  Date Range for reporting period:  Beginnin19  Ending:    Progress report will be due (10 Rx or 30 days whichever is less):        Recertification will be due (POC Duration  / 90 days whichever is less):        Visit # Insurance Allowable Requires auth   17 5 approved + 2 approved. + 2 approved + 9 (18)    []no        []yes:     Latex Allergy:  [x]NO      []YES  Preferred Language for Healthcare:   [x]English       []other:    SUBJECTIVE: The ankle is not waking him from sleep. OBJECTIVE:    Observation:       Test measurements:    ROM RIGHT current   Knee ext 5    Knee Flex 140    Ankle PF 30 40   Ankle DF Lacking 6 10   Ankle In 10 30   Ankle Ev 4 8   Strength  RIGHT    Quad tone fair    SLR indep    Ankle DF n/t    Ankle PF n/t    Ankle Inv n/t    Ankle EV n/t           Circumference    29.0 cm. Pain Scale 5-6 2-3   LEFS  80 24/80 = 70%     RESTRICTIONS/PRECAUTIONS: psoriasis    Exercises/Interventions:     See ATC. Therapeutic Ex (58876) Sets/sec Reps Notes/CUES      hep   Seated towel s:30 4x hep            hep   hep   Seated Hx R / TL  2 min hep         Roller s  2 min    Standing gastroc s :20 3x                Manual Intervention (66258)      Manual gs X 2 min     STM to posterior tib and gastroc X 5 min   Hawks .     great toe stretching X 2 min                     NMR re-education (45705)   CUES NEEDED   Tandem  2x1 min  Unable with right foot back   Weight shifts X 10     Mini squats on foam X 10      Cone walk        Therapeutic Exercise and NMR EXR  [x] (60844) Provided verbal/tactile cueing for activities related to strengthening, flexibility, endurance, ROM for improvements in LE, proximal hip, and core control with self care, mobility, lifting, ambulation.  [] (11022) Provided verbal/tactile cueing for activities related to improving balance, coordination, kinesthetic sense, posture, motor skill, proprioception  to assist with LE, proximal hip, and core control in self care, mobility, lifting, ambulation and eccentric single leg control.      NMR and Therapeutic Activities:    [] (59899 or 44174) Provided verbal/tactile cueing for activities related to improving balance, coordination, kinesthetic sense, posture, motor skill, proprioception and motor activation to allow for proper function of core, proximal hip and LE with self care and ADLs  [] (99173) Gait Re-education- Provided training and instruction to the patient for proper LE, core and proximal hip recruitment and positioning and eccentric body weight control with ambulation re-education including up and down stairs     Home Exercise Program:    [x] (03568) Reviewed/Progressed HEP activities related to strengthening, flexibility, endurance, ROM of core, proximal hip and LE for functional self-care, mobility, lifting and ambulation/stair navigation   [] (30023)Reviewed/Progressed HEP activities related to improving balance, coordination, kinesthetic sense, posture, motor skill, proprioception of core, proximal hip and LE for self care, mobility, lifting, and ambulation/stair navigation      Manual Treatments:  PROM / STM / Oscillations-Mobs:  G-I, II, III, IV (PA's, Inf., Post.)  [x] (92857) Provided manual therapy to mobilize LE, proximal hip and/or LS spine soft tissue/joints for the purpose of modulating pain, promoting relaxation,  increasing ROM, reducing/eliminating soft tissue swelling/inflammation/restriction, improving soft tissue extensibility and allowing for proper ROM for normal function with self care, mobility, lifting and ambulation. Modalities:     [] GAME READY (VASO)- for significant edema, swelling, pain control.  (low pressure)    Charges:  Timed Code Treatment Minutes: 35   Total Treatment Minutes: 55     [] EVAL (LOW) 14195 (typically 20 minutes face-to-face)  [] EVAL (MOD) 51819 (typically 30 minutes face-to-face)  [] EVAL (HIGH) 64579 (typically 45 minutes face-to-face)  [] RE-EVAL     [] BH(81943) x    [] IONTO  [x] NMR (70604) x 1     [] VASO  [x] Manual (40809) x 1     [x] Other:  Ice   [] TA x      [] Mech Traction (85800)  [] ES(attended) (47095)      [] ES (un) (57337):     GOALS:   Patient stated goal: standing/ walking/ running/ jumping. Work. [] Progressing: [] Met: [] Not Met: [] Adjusted    Therapist goals for Patient:   Short Term Goals: To be achieved in: 2 weeks  1. Independent in HEP and progression per patient tolerance, in order to prevent re-injury. [x] Progressing: [] Met: [] Not Met: [] Adjusted  2. Patient will have a decrease in pain to facilitate improvement in movement, function, and ADLs as indicated by Functional Deficits. [x] Progressing: [] Met: [] Not Met: [] Adjusted    Long Term Goals: To be achieved in: 10 weeks  1. Disability index score of 30% or less for the LEFS to assist with reaching prior level of function. [x] Progressing: [] Met: [] Not Met: [] Adjusted  2.  Patient will current plan (see comments above)  [] Plan of care initiated [] Hold pending MD visit [] Discharge      Electronically signed by:  Zulma Baltazar PT    Note: If patient does not return for scheduled/ recommended follow up visits, this note will serve as a discharge from care along with most recent update on progress.

## 2020-01-28 ENCOUNTER — OFFICE VISIT (OUTPATIENT)
Dept: ORTHOPEDIC SURGERY | Age: 50
End: 2020-01-28
Payer: COMMERCIAL

## 2020-01-28 VITALS — HEIGHT: 74 IN | WEIGHT: 257.94 LBS | BODY MASS INDEX: 33.1 KG/M2

## 2020-01-28 PROCEDURE — 99212 OFFICE O/P EST SF 10 MIN: CPT | Performed by: ORTHOPAEDIC SURGERY

## 2020-01-28 RX ORDER — MELOXICAM 15 MG/1
15 TABLET ORAL DAILY
Qty: 30 TABLET | Refills: 1 | Status: SHIPPED | OUTPATIENT
Start: 2020-01-28 | End: 2020-04-16 | Stop reason: SDUPTHER

## 2020-01-28 NOTE — PROGRESS NOTES
Subjective: Patient is here for follow-up of his 10/7/2019 open reduction internal fixation right Salvador new fracture dislocation. He states he is doing well but does not feel like he is quite ready to go back to work on his feet all day without restriction. States that his range of motion is not symmetric he has some balance issues also. Objective: Physical exam shows has near symmetric range of motion into ankle dorsiflexion plantarflexion. He feels that he cannot step forward with his left leg as far as he can with the right due to limitation in motion on the right side. Strength is 4+/5 in the dorsiflexion plantarflexion no focal weakness no tenderness at the syndesmosis incisions are intact without evidence of infection no evidence of DVT  Imaging: 3 views of the right ankle shows mortise well reduced hardware remains unchanged  Assessment and plan: This patient is doing well I gave him a return to work note light duty as of this upcoming Monday.   He will stay light duty for 4 to 6 weeks I will see him in 4 weeks repeat x-rays and hopefully I can let him go back to work without restriction

## 2020-01-28 NOTE — LETTER
[address] [place] [address]  Phone: [phone]  Fax: [phone]    Lenda Schilder, MD        January 28, 2020     Patient: Muna Baig   YOB: 1970   Date of Visit: 1/28/2020       To Whom It May Concern: It is my medical opinion that Muna Baig may return to work light duty for the next 4-6 weeks . If you have any questions or concerns, please don't hesitate to call.     Sincerely,            Lenda Schilder, MD Lenda Schilder, MD

## 2020-01-29 ENCOUNTER — HOSPITAL ENCOUNTER (OUTPATIENT)
Dept: PHYSICAL THERAPY | Age: 50
Setting detail: THERAPIES SERIES
Discharge: HOME | End: 2020-01-29
Payer: COMMERCIAL

## 2020-01-29 PROCEDURE — 97112 NEUROMUSCULAR REEDUCATION: CPT | Performed by: PHYSICAL THERAPIST

## 2020-01-29 PROCEDURE — 97140 MANUAL THERAPY 1/> REGIONS: CPT | Performed by: PHYSICAL THERAPIST

## 2020-01-29 NOTE — FLOWSHEET NOTE
load/explode              Extremity reach UE/LE                Leg Press Franklyn. 80# 3x10 80# 3x10 resume NV 80# 3x15                      Ecc. 60# 2x10 60# 3x10 60# 3x10 60# 3x12                      Inv. Calf Press Franklyn. 40# 3x10 60# 3x10                       Ecc.                            Inv.                OPAL   Flex   45# R/L 3x10 45# R/L 3x10               ABd 45# R/L 3x10 45# R/L 3x10 45# R/L 3x12 45# R/L 3x15               ADd   45# R/L 3x10 45# R/L 3x12              TKE 60# 30x5\" 75# 30x5\" to HR 75# 30x5\" to HR 75# 30x5\" to HR               Ext 60# R/L 2x10 60# R/L 3x10 60# R/L 3x12 60# R/L 3x12            Steps Up                   Up and Over                   Down     Post reach [facility] 4\" x10 VC form              Lateral                   Rotation                Squats  mini                      wall                     BOSU                Lunges:  Lunge to Balance                       Balance to Lunge                       Walking                Knee Extension Bilat. NV 20# x30 resume NV ISOs DF 10x5\"                               Ecc.                                   Inv. Hamstring Curls Bilat. NV 30# x30 30# 3x10 35# 3x10 S                               Ecc. 20# 3x10 N                              Inv.                Soleus Press Bilat. NV? 30# x20 30# 3x10 40# 3x10 S                           Ecc. 20# 3x10 N                          Inv.                                         Ladders                    Square                   Jump/Hop  Low                          Med.                          High                                                                        Modality declined declined declined declined declined   Initials                             DB DTM DTM DTM DB   Time spent one on one (workers comp)        Time spent with PT assistant

## 2020-01-29 NOTE — FLOWSHEET NOTE
hep      Seated Hx R / TL  2 min hep         Roller s  2 min    Standing gastroc s :20 3x                Manual Intervention (57955)      Manual gs X 2 min     STM to posterior tib and gastroc X 5 min   Hawks .     great toe stretching X 2 min                     NMR re-education (99773)   CUES NEEDED   Tandem  2x1 min  Unable with right foot back   Weight shifts X 10     Mini squats on foam X 10      Cone walk        Therapeutic Exercise and NMR EXR  [x] (14726) Provided verbal/tactile cueing for activities related to strengthening, flexibility, endurance, ROM for improvements in LE, proximal hip, and core control with self care, mobility, lifting, ambulation.  [] (16337) Provided verbal/tactile cueing for activities related to improving balance, coordination, kinesthetic sense, posture, motor skill, proprioception  to assist with LE, proximal hip, and core control in self care, mobility, lifting, ambulation and eccentric single leg control.      NMR and Therapeutic Activities:    [] (27193 or 88927) Provided verbal/tactile cueing for activities related to improving balance, coordination, kinesthetic sense, posture, motor skill, proprioception and motor activation to allow for proper function of core, proximal hip and LE with self care and ADLs  [] (13114) Gait Re-education- Provided training and instruction to the patient for proper LE, core and proximal hip recruitment and positioning and eccentric body weight control with ambulation re-education including up and down stairs     Home Exercise Program:    [x] (85548) Reviewed/Progressed HEP activities related to strengthening, flexibility, endurance, ROM of core, proximal hip and LE for functional self-care, mobility, lifting and ambulation/stair navigation   [] (37873)Reviewed/Progressed HEP activities related to improving balance, coordination, kinesthetic sense, posture, motor skill, proprioception of core, proximal hip and LE for self care, mobility, indicated by patients Functional Deficits. [] Progressing: [x] Met: [] Not Met: [] Adjusted  3. Patient will demonstrate an increase in Strength to good proximal hip strength and control, within 5lb HHD in LE to allow for proper functional mobility as indicated by patients Functional Deficits. [] Progressing: [x] Met: [] Not Met: [] Adjusted  4. Patient will return to ascending and descending stairs and ladders without increased symptoms or restriction. [x] Progressing: [] Met: [] Not Met: [] Adjusted  5. Able to kneel, crawl, squat and return to work. [x] Progressing: [] Met: [] Not Met: [] Adjusted    Progression Towards Functional goals:  [x] Patient is progressing as expected towards functional goals listed. [] Progression is slowed due to complexities listed. [] Progression has been slowed due to co-morbidities. [] Plan just implemented, too soon to assess goals progression  [] Other:     Overall Progression Towards Functional goals/ Treatment Progress Update:  [x] Patient is progressing as expected towards functional goals listed. [] Progression is slowed due to complexities/Impairments listed. [] Progression has been slowed due to co-morbidities. [] Plan just implemented, too soon to assess goals progression <30days   [] Goals require adjustment due to lack of progress  [] Patient is not progressing as expected and requires additional follow up with physician  [] Other    Prognosis for POC: [x] Good [] Fair  [] Poor      Patient requires continued skilled intervention: [x] Yes  [] No    Treatment/Activity Tolerance:  [x] Patient able to complete treatment  [] Patient limited by fatigue  [] Patient limited by pain    [] Patient limited by other medical complications  [] Other:     ASSESSMENT:  Cont with 1x week for 6 weeks.      PLAN: See eval  [x] Continue per plan of care [] Alter current plan (see comments above)  [] Plan of care initiated [] Hold pending MD visit [] Discharge      Electronically signed by:  Karrie Manrique PT    Note: If patient does not return for scheduled/ recommended follow up visits, this note will serve as a discharge from care along with most recent update on progress.

## 2020-02-07 ENCOUNTER — HOSPITAL ENCOUNTER (OUTPATIENT)
Dept: PHYSICAL THERAPY | Age: 50
Setting detail: THERAPIES SERIES
Discharge: HOME | End: 2020-02-07
Payer: COMMERCIAL

## 2020-02-07 PROCEDURE — 97140 MANUAL THERAPY 1/> REGIONS: CPT | Performed by: PHYSICAL THERAPIST

## 2020-02-07 PROCEDURE — 97112 NEUROMUSCULAR REEDUCATION: CPT | Performed by: PHYSICAL THERAPIST

## 2020-02-07 NOTE — FLOWSHEET NOTE
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- discussed modifying to kneeling/lunge  Tentative RTW 2020 - slippery surfaces Ankle sore today- stepped weird  Calf still a little sore from last Rx HEP given- joining a gym Begin RTW 20 4hr/day 4-6 wks  Weak w/ push-off  Pt edu post chain Back at work- dealing w/ swelling  Most difficulty w/ walking declines, sometimes incline if high enough grade- still stiff into dorsiflexion   Bike       Elliptical       Treadmill/AlterG    Sz XL  Ht 12   50% BW   HR/TR x20  6\" lat step, R diag (fwd/bwd) x10 ea    Walk 15'  Walking at home 1/2 mile - pt edu stride length 60% BW  walk  5,10,14% incline 3' ea  5, 7% decline 3', 6'   Airdyne              Gastroc stretch    3way knee drives into wall 95G2\" ea    Banded assisted NV   Soleus stretch       Hamstring stretch       ITB stretch       Hip Flexor stretch       Quad stretch       Adductor stretch              Reformer // Heels/Toes                       Wide Heels/Toes                       Walking                       HR                       Soleus Press              Weight Shifting sp   CC R/L 20# x3 ea                              fp   CC retro/fwd 40# x5 ea                              tp       Lateral walking (with/w/o TB) Balance: PEP/Paulina board                      SLS             Star excursion load/explode             Extremity reach UE/LE              Leg Press Franklyn. resume NV 80# 3x15                       Ecc. 60# 3x10 60# 3x12                       Inv. Calf Press Franklyn. 40# 3x10 60# 3x10                        Ecc.                           Inv.              OPAL   Flex 45# R/L 3x10 45# R/L 3x10                ABd 45# R/L 3x12 45# R/L 3x15                ADd 45# R/L 3x10 45# R/L 3x12               TKE 75# 30x5\" to HR 75# 30x5\" to HR                Ext 60# R/L 3x12 60# R/L 3x12            Steps Up                  Up and Over                  Down   Post reach [facility] 4\" x10 VC form               Lateral                  Rotation              Squats  mini                     wall                    [facility]              Lunges:  Lunge to Balance                      Balance to Lunge                      Walking              Knee Extension Bilat. resume NV ISOs DF 10x5\"                                Ecc.                                  Inv. Hamstring Curls Bilat. 30# 3x10 35# 3x10 S                                Ecc. 20# 3x10 N                               Inv.              Soleus Press Bilat. 30# 3x10 40# 3x10 S                            Ecc. 20# 3x10 N                           Inv.                                      Ladders                   Square                  Jump/Hop  Low                         Med.                         High                                                                     Modality declined declined declined declined   Initials                             DTM DTM DB DTM   Time spent one on one (workers comp)       Time spent with PT assistant

## 2020-02-07 NOTE — FLOWSHEET NOTE
TB 4 way BL X 30     Manual Intervention (30622)      Manual gs X 2 min     STM to posterior tib and gastroc X 5 min   Hawks .     great toe stretching X 2 min      Prone quad stretch 2 x 20\"                 NMR re-education (32118)   CUES NEEDED   Tandem  2x1 min  Unable with right foot back   Weight shifts X 10     Mini squats on foam X 10      Cone walk        Therapeutic Exercise and NMR EXR  [x] (42565) Provided verbal/tactile cueing for activities related to strengthening, flexibility, endurance, ROM for improvements in LE, proximal hip, and core control with self care, mobility, lifting, ambulation.  [] (51543) Provided verbal/tactile cueing for activities related to improving balance, coordination, kinesthetic sense, posture, motor skill, proprioception  to assist with LE, proximal hip, and core control in self care, mobility, lifting, ambulation and eccentric single leg control.      NMR and Therapeutic Activities:    [] (78752 or 46160) Provided verbal/tactile cueing for activities related to improving balance, coordination, kinesthetic sense, posture, motor skill, proprioception and motor activation to allow for proper function of core, proximal hip and LE with self care and ADLs  [] (11660) Gait Re-education- Provided training and instruction to the patient for proper LE, core and proximal hip recruitment and positioning and eccentric body weight control with ambulation re-education including up and down stairs     Home Exercise Program:    [x] (83978) Reviewed/Progressed HEP activities related to strengthening, flexibility, endurance, ROM of core, proximal hip and LE for functional self-care, mobility, lifting and ambulation/stair navigation   [] (21768)Reviewed/Progressed HEP activities related to improving balance, coordination, kinesthetic sense, posture, motor skill, proprioception of core, proximal hip and LE for self care, mobility, lifting, and ambulation/stair navigation      Manual Progressing: [x] Met: [] Not Met: [] Adjusted  3. Patient will demonstrate an increase in Strength to good proximal hip strength and control, within 5lb HHD in LE to allow for proper functional mobility as indicated by patients Functional Deficits. [] Progressing: [x] Met: [] Not Met: [] Adjusted  4. Patient will return to ascending and descending stairs and ladders without increased symptoms or restriction. [x] Progressing: [] Met: [] Not Met: [] Adjusted  5. Able to kneel, crawl, squat and return to work. [x] Progressing: [] Met: [] Not Met: [] Adjusted    Progression Towards Functional goals:  [x] Patient is progressing as expected towards functional goals listed. [] Progression is slowed due to complexities listed. [] Progression has been slowed due to co-morbidities. [] Plan just implemented, too soon to assess goals progression  [] Other:     Overall Progression Towards Functional goals/ Treatment Progress Update:  [x] Patient is progressing as expected towards functional goals listed. [] Progression is slowed due to complexities/Impairments listed. [] Progression has been slowed due to co-morbidities. [] Plan just implemented, too soon to assess goals progression <30days   [] Goals require adjustment due to lack of progress  [] Patient is not progressing as expected and requires additional follow up with physician  [] Other    Prognosis for POC: [x] Good [] Fair  [] Poor      Patient requires continued skilled intervention: [x] Yes  [] No    Treatment/Activity Tolerance:  [x] Patient able to complete treatment  [] Patient limited by fatigue  [] Patient limited by pain    [] Patient limited by other medical complications  [] Other:     ASSESSMENT:  Cont with 1x week for 6 weeks.      PLAN: See eval  [x] Continue per plan of care [] Alter current plan (see comments above)  [] Plan of care initiated [] Hold pending MD visit [] Discharge      Electronically signed by:  Adilson Worthington PT    Note: If patient does not return for scheduled/ recommended follow up visits, this note will serve as a discharge from care along with most recent update on progress.

## 2020-02-14 ENCOUNTER — HOSPITAL ENCOUNTER (OUTPATIENT)
Dept: PHYSICAL THERAPY | Age: 50
Setting detail: THERAPIES SERIES
Discharge: HOME | End: 2020-02-14
Payer: COMMERCIAL

## 2020-02-14 PROCEDURE — 97140 MANUAL THERAPY 1/> REGIONS: CPT | Performed by: PHYSICAL THERAPIST

## 2020-02-14 PROCEDURE — 97112 NEUROMUSCULAR REEDUCATION: CPT | Performed by: PHYSICAL THERAPIST

## 2020-02-14 NOTE — FLOWSHEET NOTE
Intervention (51913)      Manual gs X 2 min     STM to posterior tib and gastroc X 5 min   Hawks .     great toe stretching X 2 min      Prone quad stretch 2 x 20\"                 NMR re-education (13683)   CUES NEEDED   Tandem  2x1 min     Weight shifts X 10     Mini squats on foam X 10      Cone walk        Therapeutic Exercise and NMR EXR  [x] (44473) Provided verbal/tactile cueing for activities related to strengthening, flexibility, endurance, ROM for improvements in LE, proximal hip, and core control with self care, mobility, lifting, ambulation.  [] (62900) Provided verbal/tactile cueing for activities related to improving balance, coordination, kinesthetic sense, posture, motor skill, proprioception  to assist with LE, proximal hip, and core control in self care, mobility, lifting, ambulation and eccentric single leg control.      NMR and Therapeutic Activities:    [] (48551 or 99397) Provided verbal/tactile cueing for activities related to improving balance, coordination, kinesthetic sense, posture, motor skill, proprioception and motor activation to allow for proper function of core, proximal hip and LE with self care and ADLs  [] (89811) Gait Re-education- Provided training and instruction to the patient for proper LE, core and proximal hip recruitment and positioning and eccentric body weight control with ambulation re-education including up and down stairs     Home Exercise Program:    [x] (48087) Reviewed/Progressed HEP activities related to strengthening, flexibility, endurance, ROM of core, proximal hip and LE for functional self-care, mobility, lifting and ambulation/stair navigation   [] (27196)Reviewed/Progressed HEP activities related to improving balance, coordination, kinesthetic sense, posture, motor skill, proprioception of core, proximal hip and LE for self care, mobility, lifting, and ambulation/stair navigation      Manual Treatments:  PROM / STM / Oscillations-Mobs:  G-I, II, III, IV an increase in Strength to good proximal hip strength and control, within 5lb HHD in LE to allow for proper functional mobility as indicated by patients Functional Deficits. [] Progressing: [x] Met: [] Not Met: [] Adjusted  4. Patient will return to ascending and descending stairs and ladders without increased symptoms or restriction. [x] Progressing: [] Met: [] Not Met: [] Adjusted  5. Able to kneel, crawl, squat and return to work. [x] Progressing: [] Met: [] Not Met: [] Adjusted    Progression Towards Functional goals:  [x] Patient is progressing as expected towards functional goals listed. [] Progression is slowed due to complexities listed. [] Progression has been slowed due to co-morbidities. [] Plan just implemented, too soon to assess goals progression  [] Other:     Overall Progression Towards Functional goals/ Treatment Progress Update:  [x] Patient is progressing as expected towards functional goals listed. [] Progression is slowed due to complexities/Impairments listed. [] Progression has been slowed due to co-morbidities. [] Plan just implemented, too soon to assess goals progression <30days   [] Goals require adjustment due to lack of progress  [] Patient is not progressing as expected and requires additional follow up with physician  [] Other    Prognosis for POC: [x] Good [] Fair  [] Poor      Patient requires continued skilled intervention: [x] Yes  [] No    Treatment/Activity Tolerance:  [x] Patient able to complete treatment  [] Patient limited by fatigue  [] Patient limited by pain    [] Patient limited by other medical complications  [] Other:     ASSESSMENT:  Cont with 1x week for 6 weeks.      PLAN: See eval  [x] Continue per plan of care [] Alter current plan (see comments above)  [] Plan of care initiated [] Hold pending MD visit [] Discharge      Electronically signed by:  Marge Sommer PT    Note: If patient does not return for scheduled/ recommended follow up visits, this note will serve as a discharge from care along with most recent update on progress.

## 2020-02-14 NOTE — FLOWSHEET NOTE
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  Tentative RTW 2020 - slippery surfaces Ankle sore today- stepped weird  Calf still a little sore from last Rx HEP given- joining a gym Begin RTW 20 4hr/day 4-6 wks  Weak w/ push-off  Pt edu post chain Back at work- dealing w/ swelling  Most difficulty w/ walking declines, sometimes incline if high enough grade- still stiff into dorsiflexion Fatigue from work   Alter G only   Bike        Elliptical        Treadmill/AlterG    Sz XL  Ht 12   50% BW   HR/TR x20  6\" lat step, R diag (fwd/bwd) x10 ea    Walk 15'  Walking at home 1/2 mile - pt edu stride length 60% BW  walk  5,10,14% incline 3' ea  5, 7% decline 3', 6' 60% BW  Walk 1.6-1.8 mph  5,10,14% incline 3' ea  5, 7% decline 3', 6' 1.2-1.6 mph   Airdyne                Gastroc stretch    3way knee drives into wall 28C4\" ea    Banded assisted NV    Soleus stretch        Hamstring stretch        ITB stretch        Hip Flexor stretch        Quad stretch        Adductor stretch                Reformer // Heels/Toes                        Wide Heels/Toes                        Walking                        HR                        Soleus Press                Weight Shifting sp   CC R/L 20# x3 ea fp   CC retro/fwd 40# x5 ea                               tp        Lateral walking (with/w/o TB)                Balance: PEP/Paulina board                       SLS              Star excursion load/explode              Extremity reach UE/LE                Leg Press Franklyn. resume NV 80# 3x15                        Ecc. 60# 3x10 60# 3x12                        Inv. Calf Press Franklyn. 40# 3x10 60# 3x10                         Ecc.                            Inv.                OPAL   Flex 45# R/L 3x10 45# R/L 3x10                 ABd 45# R/L 3x12 45# R/L 3x15                 ADd 45# R/L 3x10 45# R/L 3x12                TKE 75# 30x5\" to HR 75# 30x5\" to HR                 Ext 60# R/L 3x12 60# R/L 3x12              Steps Up                   Up and Over                   Down   Post reach [facility] 4\" x10 VC form                Lateral                   Rotation                Squats  mini                      wall                     BOSU                Lunges:  Lunge to Balance                       Balance to Lunge                       Walking                Knee Extension Bilat. resume NV ISOs DF 10x5\"                                 Ecc.                                   Inv. Hamstring Curls Bilat. 30# 3x10 35# 3x10 S                                 Ecc. 20# 3x10 N                                Inv.                Soleus Press Bilat. 30# 3x10 40# 3x10 S                             Ecc. 20# 3x10 N                            Inv.                                         Ladders                    Square                   Jump/Hop  Low                          Med.                          High                                                                        Modality declined declined declined declined declined   Initials                             DTM DTM DB DTM DB   Time spent one on one (workers comp)        Time spent with PT assistant

## 2020-02-21 ENCOUNTER — HOSPITAL ENCOUNTER (OUTPATIENT)
Dept: PHYSICAL THERAPY | Age: 50
Setting detail: THERAPIES SERIES
Discharge: HOME | End: 2020-02-21
Payer: COMMERCIAL

## 2020-02-21 PROCEDURE — 97140 MANUAL THERAPY 1/> REGIONS: CPT | Performed by: PHYSICAL THERAPIST

## 2020-02-21 PROCEDURE — 97112 NEUROMUSCULAR REEDUCATION: CPT | Performed by: PHYSICAL THERAPIST

## 2020-02-21 NOTE — FLOWSHEET NOTE
[facility], [address] [place]  Phone: [phone]  Fax [phone]    Physical Therapy Treatment Note/ Progress Report:           Date:  2020    Patient Name:  Genie Coats    :  1970  MRN: [id number]  Restrictions/Precautions:    Medical/Treatment Diagnosis Information:  Diagnosis: S82.861G (ICD-10-CM) - Closed displaced [place]neuve fracture of right lower leg. DOS:  10/7/19  OPERATION PERFORMED:  Open reduction syndesmotic repair [place]uve  Treatment Diagnosis: right ankle pain  M25.571, difficulty amb T08.7  Insurance/Certification information:  PT Insurance Information: BC[employer] - 3000D-[id number]%-$[id number]- NEEDS [facility]  Physician Information:  Referring Practitioner: Dr. Cleo Blake  Has the plan of care been signed (Y/N):        [x]  Yes  []  No     Date of Patient follow up with Physician:  20    Visit # Insurance Allowable Requires auth    5 approved + 2 approved. + 2 approved + 9 (18) + 3 9 (until 20). []no        []yes:     Latex Allergy:  [x]NO      []YES  Preferred Language for Healthcare:   [x]English       []other:    SUBJECTIVE:     Frequency and intensity of pain is getting less. Pt is having more swelling,. Pt feels numbness/ tingling in leg with the increased swelling. OBJECTIVE:    Observation:       Test measurements:    ROM RIGHT current   Knee ext 5    Knee Flex 140    Ankle PF 30 42   Ankle DF Lacking 6 14   Ankle In 10 30   Ankle Ev 4 8   Strength  RIGHT    Quad tone fair    SLR indep    Ankle DF n/t 4   Ankle PF n/t 4   Ankle Inv n/t 4   Ankle EV n/t 4          Circumference    29.0 cm. Pain Scale 5-6 0 - 3   LEFS  80 39/80 =51%     RESTRICTIONS/PRECAUTIONS: psoriasis    Exercises/Interventions:     See ATC.      Therapeutic Ex (98789) Sets/sec Reps Notes/CUES   Seated HS s/  Seated towel s:30 4x hep      hep         Roller s  2 min    Standing gastroc s :20 3x    clamshells Orange X 30     TB 4 way BL X 30     Manual Intervention (53193)      Manual gs X 2 min     STM to posterior tib and gastroc X 5 min   Hawks .     great toe stretching X 2 min      Prone quad stretch 2 x 20\"                 NMR re-education (81261)   CUES NEEDED   Tandem  2x1 min             Cone walk        Therapeutic Exercise and NMR EXR  [x] (18386) Provided verbal/tactile cueing for activities related to strengthening, flexibility, endurance, ROM for improvements in LE, proximal hip, and core control with self care, mobility, lifting, ambulation.  [] (55305) Provided verbal/tactile cueing for activities related to improving balance, coordination, kinesthetic sense, posture, motor skill, proprioception  to assist with LE, proximal hip, and core control in self care, mobility, lifting, ambulation and eccentric single leg control.      NMR and Therapeutic Activities:    [] (03611 or 19995) Provided verbal/tactile cueing for activities related to improving balance, coordination, kinesthetic sense, posture, motor skill, proprioception and motor activation to allow for proper function of core, proximal hip and LE with self care and ADLs  [] (75100) Gait Re-education- Provided training and instruction to the patient for proper LE, core and proximal hip recruitment and positioning and eccentric body weight control with ambulation re-education including up and down stairs     Home Exercise Program:    [x] (13693) Reviewed/Progressed HEP activities related to strengthening, flexibility, endurance, ROM of core, proximal hip and LE for functional self-care, mobility, lifting and ambulation/stair navigation   [] (82300)Reviewed/Progressed HEP activities related to improving balance, coordination, kinesthetic sense, posture, motor skill, proprioception of core, proximal hip and LE for self care, mobility, lifting, and ambulation/stair navigation      Manual Treatments:  PROM / STM / Oscillations-Mobs:  G-I, II, III, IV (PA's, Inf., Post.)  [x] (59540) Provided manual therapy to mobilize LE, proximal hip and/or LS spine soft tissue/joints for the purpose of modulating pain, promoting relaxation,  increasing ROM, reducing/eliminating soft tissue swelling/inflammation/restriction, improving soft tissue extensibility and allowing for proper ROM for normal function with self care, mobility, lifting and ambulation. Modalities:     [] GAME READY (VASO)- for significant edema, swelling, pain control.  (low pressure)    Charges:  Timed Code Treatment Minutes: 35   Total Treatment Minutes: 55     [] EVAL (LOW) 67290 (typically 20 minutes face-to-face)  [] EVAL (MOD) 87882 (typically 30 minutes face-to-face)  [] EVAL (HIGH) 20750 (typically 45 minutes face-to-face)  [] RE-EVAL     [] OQ(84253) x    [] IONTO  [x] NMR (85317) x 1     [] VASO  [x] Manual (06601) x 1     [x] Other:  Ice   [] TA x      [] Mech Traction (60274)  [] ES(attended) (89905)      [] ES (un) (13533):     GOALS:   Patient stated goal: standing/ walking/ running/ jumping. Work. [] Progressing: [] Met: [] Not Met: [] Adjusted    Therapist goals for Patient:   Short Term Goals: To be achieved in: 2 weeks  1. Independent in HEP and progression per patient tolerance, in order to prevent re-injury. [x] Progressing: [] Met: [] Not Met: [] Adjusted  2. Patient will have a decrease in pain to facilitate improvement in movement, function, and ADLs as indicated by Functional Deficits. [x] Progressing: [] Met: [] Not Met: [] Adjusted    Long Term Goals: To be achieved in: 10 weeks  1. Disability index score of 30% or less for the LEFS to assist with reaching prior level of function. [x] Progressing: [] Met: [] Not Met: [] Adjusted  2. Patient will demonstrate increased AROM to 10 DF,  40 PF,  20 Inv, 10 EV to allow for proper joint functioning as indicated by patients Functional Deficits.    [] Progressing: [x] Met: [] Not Met: [] Adjusted  3. Patient will demonstrate an increase in Strength to good proximal hip strength and control, within 5lb HHD in LE to allow for proper functional mobility as indicated by patients Functional Deficits. [] Progressing: [x] Met: [] Not Met: [] Adjusted  4. Patient will return to ascending and descending stairs and ladders without increased symptoms or restriction. [x] Progressing: [] Met: [] Not Met: [] Adjusted  5. Able to kneel, crawl, squat and return to work. [x] Progressing: [] Met: [] Not Met: [] Adjusted    Progression Towards Functional goals:  [x] Patient is progressing as expected towards functional goals listed. [] Progression is slowed due to complexities listed. [] Progression has been slowed due to co-morbidities. [] Plan just implemented, too soon to assess goals progression  [] Other:     Overall Progression Towards Functional goals/ Treatment Progress Update:  [x] Patient is progressing as expected towards functional goals listed. [] Progression is slowed due to complexities/Impairments listed. [] Progression has been slowed due to co-morbidities. [] Plan just implemented, too soon to assess goals progression <30days   [] Goals require adjustment due to lack of progress  [] Patient is not progressing as expected and requires additional follow up with physician  [] Other    Prognosis for POC: [x] Good [] Fair  [] Poor      Patient requires continued skilled intervention: [x] Yes  [] No    Treatment/Activity Tolerance:  [x] Patient able to complete treatment  [] Patient limited by fatigue  [] Patient limited by pain    [] Patient limited by other medical complications  [] Other:     ASSESSMENT:  Cont with 1x week for 6 weeks.      PLAN: See eval  [x] Continue per plan of care [] Alter current plan (see comments above)  [] Plan of care initiated [] Hold pending MD visit [] Discharge      Electronically signed by:  Sourav Chu PT    Note: If patient does not return for scheduled/ recommended follow up visits, this note will serve as a discharge from care along with most recent update on progress.

## 2020-02-21 NOTE — FLOWSHEET NOTE
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  Tentative RTW 2020 - slippery surfaces Begin RTW 20 4hr/day 4-6 wks  Weak w/ push-off  Pt edu post chain Back at work- dealing w/ swelling  Most difficulty w/ walking declines, sometimes incline if high enough grade- still stiff into dorsiflexion Fatigue from work   Alter G only    Bike       Elliptical       Treadmill/AlterG    Sz XL  Ht 12   Walking at home 1/2 mile - pt edu stride length 60% BW  walk  5,10,14% incline 3' ea  5, 7% decline 3', 6' 60% BW  Walk 1.6-1.8 mph  5,10,14% incline 3' ea  5, 7% decline 3', 6' 1.2-1.6 mph    Airdyne              Gastroc stretch  3way knee drives into wall 12C4\" ea    Banded assisted NV     Soleus stretch       Hamstring stretch       ITB stretch       Hip Flexor stretch       Quad stretch       Adductor stretch              Reformer // Heels/Toes                       Wide Heels/Toes                       Walking                       HR                       Soleus Press              Weight Shifting sp CC R/L 20# x3 ea                                fp CC retro/fwd 40# x5 ea                                tp       Lateral walking (with/w/o TB)              Balance: PEP/Paulina board                      SLS Star excursion load/explode             Extremity reach UE/LE              Leg Press Franklyn. 100# 3x10                      Ecc. 80# 3x10                     Inv. Calf Press Franklyn. 80# 3x10                      Ecc. 60# 3x10                      Inv.              OPAL   Flex                  ABd    60# R/L 3x10              ADd                 TKE    75# 20x5\" to march              Ext    75# R/L 3x10          Steps Up                  Up and Over                  Down Post reach [facility] 4\" x10 VC form   LSD 4\" 2x10 heel off              Lateral                  Rotation              Squats  mini                     wall                    BOSU              Lunges:  Lunge to Balance                      Balance to Lunge                      Walking              Knee Extension Bilat. Ecc.                                  Inv. Hamstring Curls Bilat. Ecc. 20# 3x10 N                                 Inv.              Soleus Press Bilat. 50# 3x10 S                          Ecc. 20# 3x10 N   40# 3x10 S                          Inv.                                      Ladders                   Square                  Jump/Hop  Low                         Med.                         High                                                                     Modality declined declined declined declined   Initials                             DB DTM DB DTM   Time spent one on one (workers comp)       Time spent with PT assistant

## 2020-02-28 ENCOUNTER — OFFICE VISIT (OUTPATIENT)
Dept: ORTHOPEDIC SURGERY | Age: 50
End: 2020-02-28
Payer: COMMERCIAL

## 2020-02-28 ENCOUNTER — HOSPITAL ENCOUNTER (OUTPATIENT)
Dept: PHYSICAL THERAPY | Age: 50
Setting detail: THERAPIES SERIES
Discharge: HOME | End: 2020-02-28
Payer: COMMERCIAL

## 2020-02-28 VITALS — HEIGHT: 74 IN | WEIGHT: 257.94 LBS | BODY MASS INDEX: 33.1 KG/M2

## 2020-02-28 PROCEDURE — 97112 NEUROMUSCULAR REEDUCATION: CPT | Performed by: PHYSICAL THERAPIST

## 2020-02-28 PROCEDURE — 99212 OFFICE O/P EST SF 10 MIN: CPT | Performed by: ORTHOPAEDIC SURGERY

## 2020-02-28 PROCEDURE — 97140 MANUAL THERAPY 1/> REGIONS: CPT | Performed by: PHYSICAL THERAPIST

## 2020-02-28 NOTE — OP NOTE
[facility], [address] [place] [address]  Phone: [phone]  Fax [phone]    Date: 2/28/2020  Physician: Dr. Georg Eisenmenger  Patient: Jocelyn Garcia Diagnosis: N30.962F (ICD-10-CM) - Closed displaced [place]neuve fracture of right lower leg. DOS:  10/7/19  OPERATION PERFORMED:  Open reduction syndesmotic repair [place]    Patient has received 21 sessions of Physical Therapy over a 4 month period. Functional Questionnaire Score: Initial 80%, Current 45%  Pain reported has decreased from 5-6/10 to 0-3/10    ROM Initial (R) Initial (L) Current (R) Current (L)   DF Lacking 6  14    PF 30  50    INV 10  32    EVER 4  8           Strength       DF   5    PF   5    INV   4    EVER   5             Functional Activity Checklist: The patient continues to have moderate difficulty with the following:   [] Personal care  [] Reaching / overhead  [] Standing    [] Housework chores  [] Climbing  [] Driving / riding in a vehicle    [] Work  [] Squatting  [] Bed / vehicle mobility    [] Lifting  [] Walking  [] Sleeping    [] Pushing / pulling  [] Sitting  [] Concentrating / reading     Specific Functional Improvement and Impression:   Patient reports ankle only hurts when overstretched or plants off leg awkwardly. Patient reports muscle soreness from workout last session. Patient was able to attend work all week with majority of that standing or driving. Unable to carry a lot of weight. Difficulty walking hills and ramps due to motion of foot and stretch. Patient states he is functioning at 60% of normal function. Gastro and soleus weakness persists making push off and step thru gait difficulty at times. Patient progressing in regards to ambulation on flat and even surfaces.     Summary:   [x] Patient is progressing as expected for this condition   [x] Patient is progressing, but slower than expected for this condition   [] Patient is not progressing  Clinician Recommendations:   [x] Continue rehabilitation due to objective improvement and continued functional deficits. [] Follow up periodically to advance home exercise program to match level of function. [] Continue rehabitation due to objective improvement and continued functional deficits with progression to work conditioning. [] Discharge to post-rehab program secondary to maximizing \"medical necessity\" of physical / occupational therapy.    [] Discharge independent in a home program as:  [] All goals achieved  [] Maximized \"medical necessity\" of physical / occupational therapy  [] No subjective or objective improvements    Plan: Cont 1 x week as patient continues to lack functional strength and push off strength   Electronically signed by: Nicolás Andrea PT   License #: [id number]    Physician Recommendations:  [] Follow treatment plan as above [] Discontinue physical therapy  [] Change plan to: _______________________________________________________________

## 2020-02-28 NOTE — FLOWSHEET NOTE
TKE  75# 20x5\" to march 75# 25x5\" to march              Ext  75# R/L 3x10 75# R/L 3x10         Steps Up                 Up and Over                 Down  LSD 4\" 2x10 heel off resume NV              Lateral                 Rotation            Squats  mini                    wall                   BOSU            Lunges:  Lunge to Balance                     Balance to Lunge                     Walking            Knee Extension Bilat. Ecc.                                 Inv. Hamstring Curls Bilat. NV                              Ecc.                                 Inv.            Soleus Press Bilat. 50# 3x10 S 50# 3x10 S                          Ecc. 40# 3x10 S resume NV                          Inv.                                   Ladders                  Square                 Jump/Hop  Low                        Med.                        High                                                                  Modality declined declined declined   Initials                             DB DTM DTM   Time spent one on one (workers comp)      Time spent with PT assistant

## 2020-02-28 NOTE — FLOWSHEET NOTE
[facility], [address] [place]  Phone: [phone]  Fax [phone]    Physical Therapy Treatment Note/ Progress Report:           Date:  2020    Patient Name:  Geronimo Rodríguez    :  1970  MRN: [id number]  Restrictions/Precautions:    Medical/Treatment Diagnosis Information:  Diagnosis: S82.861G (ICD-10-CM) - Closed displaced Maisonneuve fracture of right lower leg. DOS:  10/7/19  OPERATION PERFORMED:  Open reduction syndesmotic repair Maisonneuve  Treatment Diagnosis: right ankle pain  M25.571, difficulty amb I99.1  Insurance/Certification information:  PT Insurance Information: BCBS - 3000D-MET-100%-$0CP-20PT- NEEDS [facility]  Physician Information:  Referring Practitioner: Dr. Jolene Muniz  Has the plan of care been signed (Y/N):        [x]  Yes  []  No     Date of Patient follow up with Physician:  20    Visit # Insurance Allowable Requires auth    5 approved + 2 approved. + 2 approved + 9 (18) + 3 9 (until 20). []no        []yes:     Latex Allergy:  [x]NO      []YES  Preferred Language for Healthcare:   [x]English       []other:    SUBJECTIVE:     Patient reports ankle only hurts when overstretched or plants off leg awkwardly. Patient reports muscle soreness from workout last session. Patient was able to attend work all week with majority of that standing or driving. Unable to carry a lot of weight. Difficulty walking hills and ramps due to motion of foot and stretch. Patient states he is functioning at 60% of normal function. OBJECTIVE:    Observation:       Test measurements:    ROM RIGHT current   Knee ext 5    Knee Flex 140    Ankle PF 30 50   Ankle DF Lacking 6 14   Ankle In 10 32   Ankle Ev 4 8   Strength  RIGHT    Quad tone fair    SLR indep    Ankle DF n/t 5   Ankle PF n/t 5   Ankle Inv n/t 4   Ankle EV n/t 5          Circumference    29.0 cm.            Pain Scale 5-6 0 - 3 ambulation/stair navigation   [] (39596)Reviewed/Progressed HEP activities related to improving balance, coordination, kinesthetic sense, posture, motor skill, proprioception of core, proximal hip and LE for self care, mobility, lifting, and ambulation/stair navigation      Manual Treatments:  PROM / STM / Oscillations-Mobs:  G-I, II, III, IV (PA's, Inf., Post.)  [x] (39638) Provided manual therapy to mobilize LE, proximal hip and/or LS spine soft tissue/joints for the purpose of modulating pain, promoting relaxation,  increasing ROM, reducing/eliminating soft tissue swelling/inflammation/restriction, improving soft tissue extensibility and allowing for proper ROM for normal function with self care, mobility, lifting and ambulation. Modalities:     [] GAME READY (VASO)- for significant edema, swelling, pain control.  (low pressure)    Charges:  Timed Code Treatment Minutes: 35   Total Treatment Minutes: 55     [] EVAL (LOW) 96583 (typically 20 minutes face-to-face)  [] EVAL (MOD) 54399 (typically 30 minutes face-to-face)  [] EVAL (HIGH) 77878 (typically 45 minutes face-to-face)  [] RE-EVAL     [] EX(68690) x    [] IONTO  [x] NMR (20477) x 1     [] VASO  [x] Manual (53947) x 1     [x] Other:  Ice   [] TA x      [] Mech Traction (14983)  [] ES(attended) (65275)      [] ES (un) (71279):     GOALS:   Patient stated goal: standing/ walking/ running/ jumping. Work. [] Progressing: [] Met: [] Not Met: [] Adjusted    Therapist goals for Patient:   Short Term Goals: To be achieved in: 2 weeks  1. Independent in HEP and progression per patient tolerance, in order to prevent re-injury. [x] Progressing: [] Met: [] Not Met: [] Adjusted  2. Patient will have a decrease in pain to facilitate improvement in movement, function, and ADLs as indicated by Functional Deficits. [x] Progressing: [] Met: [] Not Met: [] Adjusted    Long Term Goals: To be achieved in: 10 weeks  1.  Disability index score of 30% or less for the LEFS to assist with reaching prior level of function. [x] Progressing: [] Met: [] Not Met: [] Adjusted  2. Patient will demonstrate increased AROM to 10 DF,  40 PF,  20 Inv, 10 EV to allow for proper joint functioning as indicated by patients Functional Deficits. [] Progressing: [x] Met: [] Not Met: [] Adjusted  3. Patient will demonstrate an increase in Strength to good proximal hip strength and control, within 5lb HHD in LE to allow for proper functional mobility as indicated by patients Functional Deficits. [] Progressing: [x] Met: [] Not Met: [] Adjusted  4. Patient will return to ascending and descending stairs and ladders without increased symptoms or restriction. [x] Progressing: [] Met: [] Not Met: [] Adjusted  5. Able to kneel, crawl, squat and return to work. [x] Progressing: [] Met: [] Not Met: [] Adjusted    Progression Towards Functional goals:  [x] Patient is progressing as expected towards functional goals listed. [] Progression is slowed due to complexities listed. [] Progression has been slowed due to co-morbidities. [] Plan just implemented, too soon to assess goals progression  [] Other:     Overall Progression Towards Functional goals/ Treatment Progress Update:  [x] Patient is progressing as expected towards functional goals listed. [] Progression is slowed due to complexities/Impairments listed. [] Progression has been slowed due to co-morbidities.   [] Plan just implemented, too soon to assess goals progression <30days   [] Goals require adjustment due to lack of progress  [] Patient is not progressing as expected and requires additional follow up with physician  [] Other    Prognosis for POC: [x] Good [] Fair  [] Poor      Patient requires continued skilled intervention: [x] Yes  [] No    Treatment/Activity Tolerance:  [x] Patient able to complete treatment  [] Patient limited by fatigue  [] Patient limited by pain    [] Patient limited by other medical complications  [] Other: ASSESSMENT:  Cont with 1x week for 6 weeks. Lack push off strength. PLAN: See eval  [x] Continue per plan of care [] Alter current plan (see comments above)  [] Plan of care initiated [] Hold pending MD visit [] Discharge      Electronically signed by:  Christi Randall PT    Note: If patient does not return for scheduled/ recommended follow up visits, this note will serve as a discharge from care along with most recent update on progress.

## 2020-02-28 NOTE — PROGRESS NOTES
Subjective: Patient is here for follow-up of his right ankle Salvador new fracture open reduction internal fixation with 2 syndesmotic braces. He states he is back to work doing light duties getting better all the time.   Feels like he is doing well he wears work boots with steel toes when he is at work or he uses his brace  Objective: Physical exam shows incisions well-healed no evidence of infection sensations intact he has 20 degrees of dorsiflexion 40 degrees of plantarflexion it is not quite the same as the opposite side strength is 4/5  Imaging: 3 views of the right ankle shows mortise well reduced  Assessment and plan: Patient is doing well he is going to follow-up with me in 6 weeks he will stay on light duty for now repeat x-rays on his next visit and hopefully he can return to full duty then

## 2020-03-06 ENCOUNTER — HOSPITAL ENCOUNTER (OUTPATIENT)
Dept: PHYSICAL THERAPY | Age: 50
Setting detail: THERAPIES SERIES
Discharge: HOME | End: 2020-03-06
Payer: COMMERCIAL

## 2020-03-06 PROCEDURE — 97112 NEUROMUSCULAR REEDUCATION: CPT | Performed by: PHYSICAL THERAPIST

## 2020-03-06 PROCEDURE — 97140 MANUAL THERAPY 1/> REGIONS: CPT | Performed by: PHYSICAL THERAPIST

## 2020-03-06 NOTE — FLOWSHEET NOTE
[facility], [address] [place]  Phone: [phone]  Fax [phone]    Physical Therapy Treatment Note/ Progress Report:           Date:  3/6/2020    Patient Name:  Salomon Muñoz    :  1970  MRN: [id number]  Restrictions/Precautions:    Medical/Treatment Diagnosis Information:  Diagnosis: S82.861G (ICD-10-CM) - Closed displaced Maisonneuve fracture of right lower leg. DOS:  10/7/19  OPERATION PERFORMED:  Open reduction syndesmotic repair Maisonneuve  Treatment Diagnosis: right ankle pain  M25.571, difficulty amb X07.3  Insurance/Certification information:  PT Insurance Information: BCBS - 3000D-MET-100%-$0CP-20PT- NEEDS [facility]  Physician Information:  Referring Practitioner: Dr. Deana Mcdonald  Has the plan of care been signed (Y/N):        [x]  Yes  []  No     Date of Patient follow up with Physician:  20    Visit # Insurance Allowable Requires auth    5 approved + 2 approved. + 2 approved + 9 (18) + 3 9 (until 20). []no        []yes:     Latex Allergy:  [x]NO      []YES  Preferred Language for Healthcare:   [x]English       []other:    SUBJECTIVE:     Pt is pleased with progress. Pt notices improvements weekly. OBJECTIVE:    Observation:       Test measurements:    ROM RIGHT current   Knee ext 5    Knee Flex 140    Ankle PF 30 50   Ankle DF Lacking 6 14   Ankle In 10 32   Ankle Ev 4 8   Strength  RIGHT    Quad tone fair    SLR indep    Ankle DF n/t 5   Ankle PF n/t 5   Ankle Inv n/t 4   Ankle EV n/t 5          Circumference    29.0 cm. Pain Scale 5-6 0 - 3   LEFS  80 45%     RESTRICTIONS/PRECAUTIONS: psoriasis    Exercises/Interventions:     See ATC.      Therapeutic Ex (09729) Sets/sec Reps Notes/CUES   Seated HS s/  Seated towel s:30 4x hep      hep         Roller s  2 min    Standing gastroc s On wedge :20 3x    clamshells GVL  X 30     TB 4 way INV/ EVER BL X 30     Manual Intervention (72587)      Manual gs X 2 min     STM to posterior tib and gastroc X 5 min   Hawks .     great toe stretching X 2 min      Prone quad stretch 2 x 20\"                 NMR re-education (40329)   CUES NEEDED   Tandem stance on foam 2x1 min             GS stretch on wedge with step thru to 2\" step 20 x              Therapeutic Exercise and NMR EXR  [x] (49968) Provided verbal/tactile cueing for activities related to strengthening, flexibility, endurance, ROM for improvements in LE, proximal hip, and core control with self care, mobility, lifting, ambulation.  [] (70784) Provided verbal/tactile cueing for activities related to improving balance, coordination, kinesthetic sense, posture, motor skill, proprioception  to assist with LE, proximal hip, and core control in self care, mobility, lifting, ambulation and eccentric single leg control.      NMR and Therapeutic Activities:    [] (29808 or 17645) Provided verbal/tactile cueing for activities related to improving balance, coordination, kinesthetic sense, posture, motor skill, proprioception and motor activation to allow for proper function of core, proximal hip and LE with self care and ADLs  [] (49248) Gait Re-education- Provided training and instruction to the patient for proper LE, core and proximal hip recruitment and positioning and eccentric body weight control with ambulation re-education including up and down stairs     Home Exercise Program:    [x] (71834) Reviewed/Progressed HEP activities related to strengthening, flexibility, endurance, ROM of core, proximal hip and LE for functional self-care, mobility, lifting and ambulation/stair navigation   [] (92848)Reviewed/Progressed HEP activities related to improving balance, coordination, kinesthetic sense, posture, motor skill, proprioception of core, proximal hip and LE for self care, mobility, lifting, and ambulation/stair navigation      Manual Treatments:  PROM / STM / Oscillations-Mobs: G-I, II, III, IV (PA's, Inf., Post.)  [x] (14511) Provided manual therapy to mobilize LE, proximal hip and/or LS spine soft tissue/joints for the purpose of modulating pain, promoting relaxation,  increasing ROM, reducing/eliminating soft tissue swelling/inflammation/restriction, improving soft tissue extensibility and allowing for proper ROM for normal function with self care, mobility, lifting and ambulation. Modalities:     [] GAME READY (VASO)- for significant edema, swelling, pain control.  (low pressure)    Charges:  Timed Code Treatment Minutes: 35   Total Treatment Minutes: 55     [] EVAL (LOW) 81702 (typically 20 minutes face-to-face)  [] EVAL (MOD) 25545 (typically 30 minutes face-to-face)  [] EVAL (HIGH) 71418 (typically 45 minutes face-to-face)  [] RE-EVAL     [] NU(21982) x    [] IONTO  [x] NMR (37737) x 1     [] VASO  [x] Manual (61106) x 1     [x] Other:  Ice   [] TA x      [] Mech Traction (04211)  [] ES(attended) (47943)      [] ES (un) (10777):     GOALS:   Patient stated goal: standing/ walking/ running/ jumping. Work. [] Progressing: [] Met: [] Not Met: [] Adjusted    Therapist goals for Patient:   Short Term Goals: To be achieved in: 2 weeks  1. Independent in HEP and progression per patient tolerance, in order to prevent re-injury. [x] Progressing: [] Met: [] Not Met: [] Adjusted  2. Patient will have a decrease in pain to facilitate improvement in movement, function, and ADLs as indicated by Functional Deficits. [x] Progressing: [] Met: [] Not Met: [] Adjusted    Long Term Goals: To be achieved in: 10 weeks  1. Disability index score of 30% or less for the LEFS to assist with reaching prior level of function. [x] Progressing: [] Met: [] Not Met: [] Adjusted  2. Patient will demonstrate increased AROM to 10 DF,  40 PF,  20 Inv, 10 EV to allow for proper joint functioning as indicated by patients Functional Deficits. [] Progressing: [x] Met: [] Not Met: [] Adjusted  3.  Patient scheduled/ recommended follow up visits, this note will serve as a discharge from care along with most recent update on progress.

## 2020-03-13 ENCOUNTER — HOSPITAL ENCOUNTER (OUTPATIENT)
Dept: PHYSICAL THERAPY | Age: 50
Setting detail: THERAPIES SERIES
Discharge: HOME | End: 2020-03-13
Payer: COMMERCIAL

## 2020-03-13 PROCEDURE — 97140 MANUAL THERAPY 1/> REGIONS: CPT | Performed by: PHYSICAL THERAPIST

## 2020-03-13 PROCEDURE — 97112 NEUROMUSCULAR REEDUCATION: CPT | Performed by: PHYSICAL THERAPIST

## 2020-03-13 NOTE — FLOWSHEET NOTE
[facility], [address] [place]  Phone: [phone]  Fax [phone]      ATHLETIC TRAINING [address]  Date:  3/13/2020    Patient Name:  Salomon Muñoz    :  1970  MRN: [id number]  Restrictions/Precautions:    Medical/Treatment Diagnosis Information:  ·  R ORIF     Physician Information:   Delorisz    Weeks Post-op  8 wks  12 wks 16 wks 20 wks   24 wks                            Activity Log                                                  DOS/DOI:                                                    Date: 2020 2/21/20 2/28/20 3/13/20   ATC communication: 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- discussed modifying to kneeling/lunge  Tentative RTW 2020 - slippery surfaces Fatigue from work   Rosalina Son only  Out by 2:15    Calf very sore after last Rx    Bike       Elliptical       Treadmill/AlterG    Sz XL  Ht 12   60% BW  Walk 1.6-1.8 mph  5,10,14% incline 3' ea  5, 7% decline 3', 6' 1.2-1.6 mph      Airdyne              Gastroc stretch       Soleus stretch       Hamstring stretch       ITB stretch       Hip Flexor stretch       Quad stretch       Adductor stretch              Reformer // Heels/Toes                       Wide Heels/Toes                       Walking                       HR                       Soleus Press              Weight Shifting sp                                 fp                                 tp       Lateral walking (with/w/o TB)              Balance: PEP/Paulina board                      SLS             Star excursion load/explode             Extremity reach UE/LE              Leg Press Franklyn. 100# 3x10 120# 3x10 # 3x10                     Ecc. 80# 3x10 80# 3x10 # 3x10                     Inv. Calf Press Franklyn. 80# 3x10 80# 3x10 # 3x10                      Ecc.    60# 3x10 resume NV IH 80# 2x10                      Inv. [facility]   Flex                  ABd  60# R/L 3x10 60# R/L 3x10 60# R/L 3x12              ADd                 TKE  75# 20x5\" to march 75# 25x5\" to march 75# 30x5\" to march              Ext  75# R/L 3x10 75# R/L 3x10 75# R/L 3x12          Steps Up                  Up and Over                  Down  LSD 4\" 2x10 heel off resume NV               Lateral                  Rotation              Squats  mini                     wall                    BOSU              Lunges:  Lunge to Balance                      Balance to Lunge                      Walking              Knee Extension Bilat. Ecc.                                  Inv. Hamstring Curls Bilat. NV IH 55# 3x10                              Ecc.                                  Inv.              Soleus Press Bilat. 50# 3x10 S 50# 3x10 S IH 60# 3x10 N                          Ecc. 40# 3x10 S resume NV IH 50# 3x10 N                          Inv.                                      Ladders                   Square                  Jump/Hop  Low                         Med.                         High                                                                     Modality declined declined declined declined   Initials                             DB DTM DTM DTM   Time spent one on one (workers comp)       Time spent with PT assistant

## 2020-03-13 NOTE — FLOWSHEET NOTE
gissell GVL  X 30     TB 4 way INV/ EVER BL X 30     Manual Intervention (73865)      Manual gs X 2 min     STM to posterior tib and gastroc X 5 min   Hawks .     great toe stretching X 2 min      Prone quad stretch 2 x 20\"                 NMR re-education (36591)   CUES NEEDED   Tandem stance on foam 2x1 min             GS stretch on wedge with step thru to 2\" step 20 x              Therapeutic Exercise and NMR EXR  [x] (13584) Provided verbal/tactile cueing for activities related to strengthening, flexibility, endurance, ROM for improvements in LE, proximal hip, and core control with self care, mobility, lifting, ambulation.  [] (02405) Provided verbal/tactile cueing for activities related to improving balance, coordination, kinesthetic sense, posture, motor skill, proprioception  to assist with LE, proximal hip, and core control in self care, mobility, lifting, ambulation and eccentric single leg control.      NMR and Therapeutic Activities:    [] (01016 or 77487) Provided verbal/tactile cueing for activities related to improving balance, coordination, kinesthetic sense, posture, motor skill, proprioception and motor activation to allow for proper function of core, proximal hip and LE with self care and ADLs  [] (35937) Gait Re-education- Provided training and instruction to the patient for proper LE, core and proximal hip recruitment and positioning and eccentric body weight control with ambulation re-education including up and down stairs     Home Exercise Program:    [x] (91354) Reviewed/Progressed HEP activities related to strengthening, flexibility, endurance, ROM of core, proximal hip and LE for functional self-care, mobility, lifting and ambulation/stair navigation   [] (45489)Reviewed/Progressed HEP activities related to improving balance, coordination, kinesthetic sense, posture, motor skill, proprioception of core, proximal hip and LE for self care, mobility, lifting, and ambulation/stair navigation      Manual Treatments:  PROM / STM / Oscillations-Mobs:  G-I, II, III, IV (PA's, Inf., Post.)  [x] (65934) Provided manual therapy to mobilize LE, proximal hip and/or LS spine soft tissue/joints for the purpose of modulating pain, promoting relaxation,  increasing ROM, reducing/eliminating soft tissue swelling/inflammation/restriction, improving soft tissue extensibility and allowing for proper ROM for normal function with self care, mobility, lifting and ambulation. Modalities:     [] GAME READY (VASO)- for significant edema, swelling, pain control.  (low pressure)    Charges:  Timed Code Treatment Minutes: 35   Total Treatment Minutes: 55     [] EVAL (LOW) 59750 (typically 20 minutes face-to-face)  [] EVAL (MOD) 46047 (typically 30 minutes face-to-face)  [] EVAL (HIGH) 60169 (typically 45 minutes face-to-face)  [] RE-EVAL     [] GQ(58031) x    [] IONTO  [x] NMR (63614) x 1     [] VASO  [x] Manual (19439) x 1     [x] Other:  Ice   [] TA x      [] Mech Traction (45417)  [] ES(attended) (80602)      [] ES (un) (85130):     GOALS:   Patient stated goal: standing/ walking/ running/ jumping. Work. [] Progressing: [] Met: [] Not Met: [] Adjusted    Therapist goals for Patient:   Short Term Goals: To be achieved in: 2 weeks  1. Independent in HEP and progression per patient tolerance, in order to prevent re-injury. [x] Progressing: [] Met: [] Not Met: [] Adjusted  2. Patient will have a decrease in pain to facilitate improvement in movement, function, and ADLs as indicated by Functional Deficits. [x] Progressing: [] Met: [] Not Met: [] Adjusted    Long Term Goals: To be achieved in: 10 weeks  1. Disability index score of 30% or less for the LEFS to assist with reaching prior level of function. [x] Progressing: [] Met: [] Not Met: [] Adjusted  2.  Patient will demonstrate increased AROM to 10 DF,  40 PF,  20 Inv, 10 EV to allow for proper joint functioning as indicated by patients Functional

## 2020-03-20 ENCOUNTER — HOSPITAL ENCOUNTER (OUTPATIENT)
Dept: PHYSICAL THERAPY | Age: 50
Setting detail: THERAPIES SERIES
Discharge: HOME | End: 2020-03-20
Payer: COMMERCIAL

## 2020-03-20 PROCEDURE — 97140 MANUAL THERAPY 1/> REGIONS: CPT | Performed by: PHYSICAL THERAPIST

## 2020-03-20 PROCEDURE — 97112 NEUROMUSCULAR REEDUCATION: CPT | Performed by: PHYSICAL THERAPIST

## 2020-03-20 NOTE — FLOWSHEET NOTE
clamsrubia GVL  X 30     TB 4 way INV/ EVER BL X 30     Manual Intervention (21329)      Manual gs X 2 min     STM to posterior tib and gastroc X 5 min   Hawks .     great toe stretching X 2 min      Prone quad stretch 2 x 20\"                 NMR re-education (49081)   CUES NEEDED   Tandem stance on foam 2x1 min             GS stretch on wedge with step thru to 2\" step 20 x              Therapeutic Exercise and NMR EXR  [x] (46019) Provided verbal/tactile cueing for activities related to strengthening, flexibility, endurance, ROM for improvements in LE, proximal hip, and core control with self care, mobility, lifting, ambulation.  [] (54675) Provided verbal/tactile cueing for activities related to improving balance, coordination, kinesthetic sense, posture, motor skill, proprioception  to assist with LE, proximal hip, and core control in self care, mobility, lifting, ambulation and eccentric single leg control.      NMR and Therapeutic Activities:    [] (80301 or 95557) Provided verbal/tactile cueing for activities related to improving balance, coordination, kinesthetic sense, posture, motor skill, proprioception and motor activation to allow for proper function of core, proximal hip and LE with self care and ADLs  [] (87014) Gait Re-education- Provided training and instruction to the patient for proper LE, core and proximal hip recruitment and positioning and eccentric body weight control with ambulation re-education including up and down stairs     Home Exercise Program:    [x] (99056) Reviewed/Progressed HEP activities related to strengthening, flexibility, endurance, ROM of core, proximal hip and LE for functional self-care, mobility, lifting and ambulation/stair navigation   [] (73584)Reviewed/Progressed HEP activities related to improving balance, coordination, kinesthetic sense, posture, motor skill, proprioception of core, proximal hip and LE for self care, mobility, lifting, and ambulation/stair Deficits. [] Progressing: [x] Met: [] Not Met: [] Adjusted  3. Patient will demonstrate an increase in Strength to good proximal hip strength and control, within 5lb HHD in LE to allow for proper functional mobility as indicated by patients Functional Deficits. [] Progressing: [x] Met: [] Not Met: [] Adjusted  4. Patient will return to ascending and descending stairs and ladders without increased symptoms or restriction. [x] Progressing: [] Met: [] Not Met: [] Adjusted  5. Able to kneel, crawl, squat and return to work. [x] Progressing: [] Met: [] Not Met: [] Adjusted    Progression Towards Functional goals:  [x] Patient is progressing as expected towards functional goals listed. [] Progression is slowed due to complexities listed. [] Progression has been slowed due to co-morbidities. [] Plan just implemented, too soon to assess goals progression  [] Other:     Overall Progression Towards Functional goals/ Treatment Progress Update:  [x] Patient is progressing as expected towards functional goals listed. [] Progression is slowed due to complexities/Impairments listed. [] Progression has been slowed due to co-morbidities. [] Plan just implemented, too soon to assess goals progression <30days   [] Goals require adjustment due to lack of progress  [] Patient is not progressing as expected and requires additional follow up with physician  [] Other    Prognosis for POC: [x] Good [] Fair  [] Poor      Patient requires continued skilled intervention: [x] Yes  [] No    Treatment/Activity Tolerance:  [x] Patient able to complete treatment  [] Patient limited by fatigue  [] Patient limited by pain    [] Patient limited by other medical complications  [] Other:     ASSESSMENT:  Pt to work on HEP at home during time of social distancing. Pt comprehensive HEP and therabands.      PLAN: See eval  [x] Continue per plan of care [] Alter current plan (see comments above)  [] Plan of care initiated [] Hold pending MD visit [] Discharge      Electronically signed by:  Adilson Worthington PT    Note: If patient does not return for scheduled/ recommended follow up visits, this note will serve as a discharge from care along with most recent update on progress.

## 2020-03-20 NOTE — FLOWSHEET NOTE
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- discussed modifying to kneeling/lunge  Tentative RTW 2020 - slippery surfaces Fatigue from work   Malika Estuardo only  Out by 2:15    Calf very sore after last Rx     Bike        Elliptical        Treadmill/AlterG    Sz XL  Ht 12   60% BW  Walk 1.6-1.8 mph  5,10,14% incline 3' ea  5, 7% decline 3', 6' 1.2-1.6 mph       Airdyne                Gastroc stretch        Soleus stretch        Hamstring stretch        ITB stretch        Hip Flexor stretch        Quad stretch        Adductor stretch                Reformer // Heels/Toes                        Wide Heels/Toes                        Walking                        HR                        Soleus Press                Weight Shifting sp                                  fp                                  tp        Lateral walking (with/w/o TB)                Balance: PEP/Paulina board                       SLS              Star excursion load/explode              Extremity reach UE/LE                Leg Press Franklyn. 100# 3x10 120# 3x10 # 3x10 # 3x10                     Ecc. 80# 3x10 80# 3x10 # 3x10 # 3x10                     Inv. Calf Press Franklyn.   80# 3x10 80# 3x10 # 3x10 # 3x10 Ecc.   60# 3x10 resume NV IH 80# 2x10 IH 60# 3x10                      Inv.                OPAL   Flex                   ABd  60# R/L 3x10 60# R/L 3x10 60# R/L 3x12 60# R/L 3x10              ADd                  TKE  75# 20x5\" to march 75# 25x5\" to march 75# 30x5\" to march               Ext  75# R/L 3x10 75# R/L 3x10 75# R/L 3x12 75# R/L 3x10           Steps Up                   Up and Over                   Down  LSD 4\" 2x10 heel off resume NV  LSD 4\" 2x10              Lateral                   Rotation                Squats  mini                      wall                     BOSU                Lunges:  Lunge to Balance                       Balance to Lunge                       Walking                Knee Extension Bilat. Ecc.                                   Inv. Hamstring Curls Bilat. NV IH 55# 3x10 IH 55# 3x10                              Ecc.                                   Inv.                Soleus Press Bilat. 50# 3x10 S 50# 3x10 S IH 60# 3x10 N IH 60# 3x10 N                          Ecc. 40# 3x10 S resume NV IH 50# 3x10 N IH 50# 3x10 N                          Inv.                                         Ladders                    Square                   Jump/Hop  Low                          Med.                          High                                                                        Modality declined declined declined declined declined   Initials                             DB DTM DTM DTM DB   Time spent one on one (workers comp)        Time spent with PT assistant

## 2020-04-16 RX ORDER — MELOXICAM 15 MG/1
15 TABLET ORAL DAILY
Qty: 30 TABLET | Refills: 3 | Status: SHIPPED | OUTPATIENT
Start: 2020-04-16 | End: 2020-07-21 | Stop reason: SDUPTHER

## 2020-07-21 ENCOUNTER — TELEPHONE (OUTPATIENT)
Dept: ORTHOPEDIC SURGERY | Age: 50
End: 2020-07-21

## 2020-07-21 RX ORDER — MELOXICAM 15 MG/1
15 TABLET ORAL DAILY
Qty: 90 TABLET | Refills: 1 | Status: SHIPPED | OUTPATIENT
Start: 2020-07-21

## (undated) DEVICE — TELFA NON-ADHERENT ABSORBENT DRESSING: Brand: TELFA

## (undated) DEVICE — SPLINT ORTH W4XL30IN LAYERED FBRGLS FOAM PD BRTH BK MOLD

## (undated) DEVICE — DRAPE EQUIP C ARM MINI 10000100] TIDI PRODUCTS INC]

## (undated) DEVICE — 3M™ TEGADERM™ TRANSPARENT FILM DRESSING FRAME STYLE, 1624W, 2-3/8 IN X 2-3/4 IN (6 CM X 7 CM), 100/CT 4CT/CASE: Brand: 3M™ TEGADERM™

## (undated) DEVICE — SUTURE MCRYL SZ 4-0 L18IN ABSRB UD L19MM PS-2 3/8 CIR PRIM Y496G

## (undated) DEVICE — PADDING CAST W4INXL4YD NONSTERILE COT RAYON MICROPLEATED

## (undated) DEVICE — CATHETER IV 20GA L1.25IN PNK FEP SFTY STR HUB RADPQ DISP

## (undated) DEVICE — ZIMMER® STERILE DISPOSABLE TOURNIQUET CUFF WITH PLC, DUAL PORT, SINGLE BLADDER, 30 IN. (76 CM)

## (undated) DEVICE — SET ADMIN PRIMING 7ML L30IN 7.35LB 20 GTT 2ND RLER CLMP

## (undated) DEVICE — SET GRAV VENT NVENT CK VLV 3 NDL FREE PRT 10 GTT

## (undated) DEVICE — PADDING UNDERCAST W6INXL4YD WYTEX 6 PER BG

## (undated) DEVICE — PACK EXTREMITY XR

## (undated) DEVICE — GLOVE SURG SZ 8 L12IN FNGR THK94MIL STD WHT LTX FREE

## (undated) DEVICE — INTENDED FOR TISSUE SEPARATION, AND OTHER PROCEDURES THAT REQUIRE A SHARP SURGICAL BLADE TO PUNCTURE OR CUT.: Brand: BARD-PARKER ® STAINLESS STEEL BLADES

## (undated) DEVICE — PADDING UNDERCAST W4INXL4YD COT FBR LO LINTING WYTEX

## (undated) DEVICE — SUTURE VCRL SZ 3-0 L18IN ABSRB UD L26MM SH 1/2 CIR J864D

## (undated) DEVICE — SOLUTION IV 1000ML LAC RINGERS PH 6.5 INJ USP VIAFLX PLAS

## (undated) DEVICE — ZIP 8I SURGICAL SKIN CLOSURE DEVICE: Brand: ZIP 8I SURGICAL SKIN CLOSURE DEVICE

## (undated) DEVICE — SUTURE VCRL SZ 2-0 L18IN ABSRB UD CT-1 L36MM 1/2 CIR J839D

## (undated) DEVICE — GOWN,SIRUS,POLYRNF,SETINSLV,L,20/CS: Brand: MEDLINE